# Patient Record
Sex: FEMALE | Race: WHITE | NOT HISPANIC OR LATINO | Employment: OTHER | URBAN - METROPOLITAN AREA
[De-identification: names, ages, dates, MRNs, and addresses within clinical notes are randomized per-mention and may not be internally consistent; named-entity substitution may affect disease eponyms.]

---

## 2017-03-24 ENCOUNTER — OFFICE VISIT - HEALTHEAST (OUTPATIENT)
Dept: INTERNAL MEDICINE | Facility: CLINIC | Age: 72
End: 2017-03-24

## 2017-03-24 DIAGNOSIS — I10 ESSENTIAL HYPERTENSION: ICD-10-CM

## 2017-03-24 DIAGNOSIS — Z00.00 PREVENTATIVE HEALTH CARE: ICD-10-CM

## 2017-03-24 DIAGNOSIS — Z00.00 HEALTHCARE MAINTENANCE: ICD-10-CM

## 2017-03-24 DIAGNOSIS — Z51.81 MEDICATION MONITORING ENCOUNTER: ICD-10-CM

## 2017-03-24 DIAGNOSIS — I10 ESSENTIAL HYPERTENSION WITH GOAL BLOOD PRESSURE LESS THAN 140/90: ICD-10-CM

## 2017-03-24 DIAGNOSIS — E78.00 HYPERCHOLESTEROLEMIA: ICD-10-CM

## 2017-03-24 DIAGNOSIS — E55.9 VITAMIN D DEFICIENCY: ICD-10-CM

## 2017-03-24 DIAGNOSIS — B00.9 HERPES SIMPLEX: ICD-10-CM

## 2017-03-24 DIAGNOSIS — E78.5 HYPERLIPIDEMIA: ICD-10-CM

## 2017-03-24 DIAGNOSIS — Z12.31 OTHER SCREENING MAMMOGRAM: ICD-10-CM

## 2017-03-24 DIAGNOSIS — M19.90 ARTHRITIS: ICD-10-CM

## 2017-03-24 LAB
CHOLEST SERPL-MCNC: 195 MG/DL
FASTING STATUS PATIENT QL REPORTED: YES
HDLC SERPL-MCNC: 78 MG/DL
LDLC SERPL CALC-MCNC: 107 MG/DL
TRIGL SERPL-MCNC: 48 MG/DL

## 2017-03-24 ASSESSMENT — MIFFLIN-ST. JEOR: SCORE: 1290.36

## 2017-03-26 ENCOUNTER — COMMUNICATION - HEALTHEAST (OUTPATIENT)
Dept: NURSING | Facility: CLINIC | Age: 72
End: 2017-03-26

## 2017-03-26 DIAGNOSIS — J30.9 ALLERGIC RHINITIS: ICD-10-CM

## 2017-03-28 ENCOUNTER — COMMUNICATION - HEALTHEAST (OUTPATIENT)
Dept: INTERNAL MEDICINE | Facility: CLINIC | Age: 72
End: 2017-03-28

## 2017-04-17 ENCOUNTER — RECORDS - HEALTHEAST (OUTPATIENT)
Dept: ADMINISTRATIVE | Facility: OTHER | Age: 72
End: 2017-04-17

## 2017-04-17 ENCOUNTER — RECORDS - HEALTHEAST (OUTPATIENT)
Dept: BONE DENSITY | Facility: CLINIC | Age: 72
End: 2017-04-17

## 2017-04-17 ENCOUNTER — RECORDS - HEALTHEAST (OUTPATIENT)
Dept: MAMMOGRAPHY | Facility: CLINIC | Age: 72
End: 2017-04-17

## 2017-04-17 DIAGNOSIS — Z00.00 ENCOUNTER FOR GENERAL ADULT MEDICAL EXAMINATION WITHOUT ABNORMAL FINDINGS: ICD-10-CM

## 2017-04-19 ENCOUNTER — COMMUNICATION - HEALTHEAST (OUTPATIENT)
Dept: INTERNAL MEDICINE | Facility: CLINIC | Age: 72
End: 2017-04-19

## 2017-05-10 ENCOUNTER — RECORDS - HEALTHEAST (OUTPATIENT)
Dept: ADMINISTRATIVE | Facility: OTHER | Age: 72
End: 2017-05-10

## 2017-08-21 ENCOUNTER — RECORDS - HEALTHEAST (OUTPATIENT)
Dept: ADMINISTRATIVE | Facility: OTHER | Age: 72
End: 2017-08-21

## 2017-12-29 ENCOUNTER — AMBULATORY - HEALTHEAST (OUTPATIENT)
Dept: INTERNAL MEDICINE | Facility: CLINIC | Age: 72
End: 2017-12-29

## 2017-12-29 ENCOUNTER — OFFICE VISIT - HEALTHEAST (OUTPATIENT)
Dept: INTERNAL MEDICINE | Facility: CLINIC | Age: 72
End: 2017-12-29

## 2017-12-29 ENCOUNTER — COMMUNICATION - HEALTHEAST (OUTPATIENT)
Dept: INTERNAL MEDICINE | Facility: CLINIC | Age: 72
End: 2017-12-29

## 2017-12-29 DIAGNOSIS — E78.00 HYPERCHOLESTEROLEMIA: ICD-10-CM

## 2017-12-29 DIAGNOSIS — Z01.818 PREOP EXAM FOR INTERNAL MEDICINE: ICD-10-CM

## 2017-12-29 DIAGNOSIS — R09.89 BRUIT OF LEFT CAROTID ARTERY: ICD-10-CM

## 2017-12-29 DIAGNOSIS — I10 ESSENTIAL HYPERTENSION: ICD-10-CM

## 2017-12-29 DIAGNOSIS — E78.00 PURE HYPERCHOLESTEROLEMIA: ICD-10-CM

## 2017-12-29 LAB
CHOLEST SERPL-MCNC: 210 MG/DL
FASTING STATUS PATIENT QL REPORTED: YES
HDLC SERPL-MCNC: 83 MG/DL
LDLC SERPL CALC-MCNC: 110 MG/DL
TRIGL SERPL-MCNC: 83 MG/DL

## 2017-12-29 ASSESSMENT — MIFFLIN-ST. JEOR: SCORE: 1270.86

## 2018-01-02 LAB
ATRIAL RATE - MUSE: 83 BPM
DIASTOLIC BLOOD PRESSURE - MUSE: NORMAL MMHG
INTERPRETATION ECG - MUSE: NORMAL
P AXIS - MUSE: 48 DEGREES
PR INTERVAL - MUSE: 166 MS
QRS DURATION - MUSE: 98 MS
QT - MUSE: 374 MS
QTC - MUSE: 439 MS
R AXIS - MUSE: 40 DEGREES
SYSTOLIC BLOOD PRESSURE - MUSE: NORMAL MMHG
T AXIS - MUSE: 25 DEGREES
VENTRICULAR RATE- MUSE: 83 BPM

## 2018-01-03 ENCOUNTER — AMBULATORY - HEALTHEAST (OUTPATIENT)
Dept: LAB | Facility: CLINIC | Age: 73
End: 2018-01-03

## 2018-01-03 ENCOUNTER — COMMUNICATION - HEALTHEAST (OUTPATIENT)
Dept: INTERNAL MEDICINE | Facility: CLINIC | Age: 73
End: 2018-01-03

## 2018-01-03 DIAGNOSIS — R09.89 BRUIT OF LEFT CAROTID ARTERY: ICD-10-CM

## 2018-01-03 LAB
ANION GAP SERPL CALCULATED.3IONS-SCNC: 9 MMOL/L (ref 5–18)
BUN SERPL-MCNC: 15 MG/DL (ref 8–28)
CALCIUM SERPL-MCNC: 10 MG/DL (ref 8.5–10.5)
CHLORIDE BLD-SCNC: 101 MMOL/L (ref 98–107)
CO2 SERPL-SCNC: 28 MMOL/L (ref 22–31)
CREAT SERPL-MCNC: 0.69 MG/DL (ref 0.6–1.1)
GFR SERPL CREATININE-BSD FRML MDRD: >60 ML/MIN/1.73M2
GLUCOSE BLD-MCNC: 87 MG/DL (ref 70–125)
POTASSIUM BLD-SCNC: 4.7 MMOL/L (ref 3.5–5)
SODIUM SERPL-SCNC: 138 MMOL/L (ref 136–145)

## 2018-01-03 ASSESSMENT — MIFFLIN-ST. JEOR: SCORE: 1268.59

## 2018-01-04 ENCOUNTER — COMMUNICATION - HEALTHEAST (OUTPATIENT)
Dept: INTERNAL MEDICINE | Facility: CLINIC | Age: 73
End: 2018-01-04

## 2018-01-04 ASSESSMENT — MIFFLIN-ST. JEOR: SCORE: 1256.11

## 2018-01-08 ENCOUNTER — ANESTHESIA - HEALTHEAST (OUTPATIENT)
Dept: SURGERY | Facility: CLINIC | Age: 73
End: 2018-01-08

## 2018-01-09 ENCOUNTER — SURGERY - HEALTHEAST (OUTPATIENT)
Dept: SURGERY | Facility: CLINIC | Age: 73
End: 2018-01-09

## 2018-01-25 ENCOUNTER — RECORDS - HEALTHEAST (OUTPATIENT)
Dept: ADMINISTRATIVE | Facility: OTHER | Age: 73
End: 2018-01-25

## 2018-02-22 ENCOUNTER — RECORDS - HEALTHEAST (OUTPATIENT)
Dept: ADMINISTRATIVE | Facility: OTHER | Age: 73
End: 2018-02-22

## 2018-03-25 ENCOUNTER — COMMUNICATION - HEALTHEAST (OUTPATIENT)
Dept: INTERNAL MEDICINE | Facility: CLINIC | Age: 73
End: 2018-03-25

## 2018-03-25 DIAGNOSIS — E78.5 HYPERLIPIDEMIA: ICD-10-CM

## 2018-03-25 DIAGNOSIS — I10 ESSENTIAL HYPERTENSION: ICD-10-CM

## 2018-04-17 ENCOUNTER — COMMUNICATION - HEALTHEAST (OUTPATIENT)
Dept: INTERNAL MEDICINE | Facility: CLINIC | Age: 73
End: 2018-04-17

## 2018-04-17 DIAGNOSIS — I10 ESSENTIAL HYPERTENSION: ICD-10-CM

## 2018-04-25 ENCOUNTER — RECORDS - HEALTHEAST (OUTPATIENT)
Dept: ADMINISTRATIVE | Facility: OTHER | Age: 73
End: 2018-04-25

## 2018-06-05 ENCOUNTER — COMMUNICATION - HEALTHEAST (OUTPATIENT)
Dept: INTERNAL MEDICINE | Facility: CLINIC | Age: 73
End: 2018-06-05

## 2018-06-05 DIAGNOSIS — B00.9 HERPES SIMPLEX: ICD-10-CM

## 2018-06-17 ENCOUNTER — RECORDS - HEALTHEAST (OUTPATIENT)
Dept: ADMINISTRATIVE | Facility: OTHER | Age: 73
End: 2018-06-17

## 2018-06-20 ENCOUNTER — RECORDS - HEALTHEAST (OUTPATIENT)
Dept: ADMINISTRATIVE | Facility: OTHER | Age: 73
End: 2018-06-20

## 2018-10-16 ENCOUNTER — COMMUNICATION - HEALTHEAST (OUTPATIENT)
Dept: INTERNAL MEDICINE | Facility: CLINIC | Age: 73
End: 2018-10-16

## 2018-10-16 DIAGNOSIS — B00.9 HERPES SIMPLEX: ICD-10-CM

## 2018-12-19 ENCOUNTER — COMMUNICATION - HEALTHEAST (OUTPATIENT)
Dept: INTERNAL MEDICINE | Facility: CLINIC | Age: 73
End: 2018-12-19

## 2018-12-19 DIAGNOSIS — Z00.00 ROUTINE HEALTH MAINTENANCE: ICD-10-CM

## 2019-02-18 ENCOUNTER — COMMUNICATION - HEALTHEAST (OUTPATIENT)
Dept: INTERNAL MEDICINE | Facility: CLINIC | Age: 74
End: 2019-02-18

## 2019-02-18 DIAGNOSIS — E78.00 PURE HYPERCHOLESTEROLEMIA: ICD-10-CM

## 2019-03-12 ENCOUNTER — OFFICE VISIT - HEALTHEAST (OUTPATIENT)
Dept: INTERNAL MEDICINE | Facility: CLINIC | Age: 74
End: 2019-03-12

## 2019-03-12 DIAGNOSIS — M21.611 BUNION, RIGHT: ICD-10-CM

## 2019-03-12 DIAGNOSIS — E78.00 HYPERCHOLESTEROLEMIA: ICD-10-CM

## 2019-03-12 DIAGNOSIS — I10 ESSENTIAL HYPERTENSION: ICD-10-CM

## 2019-03-12 DIAGNOSIS — I65.23 CAROTID ATHEROSCLEROSIS, BILATERAL: ICD-10-CM

## 2019-03-12 DIAGNOSIS — Z12.11 SCREEN FOR COLON CANCER: ICD-10-CM

## 2019-03-12 DIAGNOSIS — Z51.81 MEDICATION MONITORING ENCOUNTER: ICD-10-CM

## 2019-03-12 DIAGNOSIS — Z00.00 PREVENTATIVE HEALTH CARE: ICD-10-CM

## 2019-03-12 DIAGNOSIS — R43.0 ANOSMIA: ICD-10-CM

## 2019-03-12 LAB
ALBUMIN SERPL-MCNC: 4.4 G/DL (ref 3.5–5)
ALP SERPL-CCNC: 76 U/L (ref 45–120)
ALT SERPL W P-5'-P-CCNC: 31 U/L (ref 0–45)
ANION GAP SERPL CALCULATED.3IONS-SCNC: 10 MMOL/L (ref 5–18)
AST SERPL W P-5'-P-CCNC: 34 U/L (ref 0–40)
BILIRUB SERPL-MCNC: 0.6 MG/DL (ref 0–1)
BUN SERPL-MCNC: 11 MG/DL (ref 8–28)
CALCIUM SERPL-MCNC: 10.2 MG/DL (ref 8.5–10.5)
CHLORIDE BLD-SCNC: 92 MMOL/L (ref 98–107)
CHOLEST SERPL-MCNC: 165 MG/DL
CO2 SERPL-SCNC: 26 MMOL/L (ref 22–31)
CREAT SERPL-MCNC: 0.72 MG/DL (ref 0.6–1.1)
ERYTHROCYTE [DISTWIDTH] IN BLOOD BY AUTOMATED COUNT: 11.1 % (ref 11–14.5)
FASTING STATUS PATIENT QL REPORTED: YES
GFR SERPL CREATININE-BSD FRML MDRD: >60 ML/MIN/1.73M2
GLUCOSE BLD-MCNC: 75 MG/DL (ref 70–125)
HCT VFR BLD AUTO: 38.7 % (ref 35–47)
HDLC SERPL-MCNC: 77 MG/DL
HGB BLD-MCNC: 13.2 G/DL (ref 12–16)
LDLC SERPL CALC-MCNC: 75 MG/DL
MCH RBC QN AUTO: 33.3 PG (ref 27–34)
MCHC RBC AUTO-ENTMCNC: 34.1 G/DL (ref 32–36)
MCV RBC AUTO: 98 FL (ref 80–100)
PLATELET # BLD AUTO: 322 THOU/UL (ref 140–440)
PMV BLD AUTO: 6.7 FL (ref 7–10)
POTASSIUM BLD-SCNC: 4.6 MMOL/L (ref 3.5–5)
PROT SERPL-MCNC: 7.2 G/DL (ref 6–8)
RBC # BLD AUTO: 3.96 MILL/UL (ref 3.8–5.4)
SODIUM SERPL-SCNC: 128 MMOL/L (ref 136–145)
TRIGL SERPL-MCNC: 63 MG/DL
TSH SERPL DL<=0.005 MIU/L-ACNC: 0.96 UIU/ML (ref 0.3–5)
WBC: 5.7 THOU/UL (ref 4–11)

## 2019-03-12 ASSESSMENT — MIFFLIN-ST. JEOR: SCORE: 1174.75

## 2019-03-13 ENCOUNTER — COMMUNICATION - HEALTHEAST (OUTPATIENT)
Dept: INTERNAL MEDICINE | Facility: CLINIC | Age: 74
End: 2019-03-13

## 2019-03-19 ENCOUNTER — RECORDS - HEALTHEAST (OUTPATIENT)
Dept: MAMMOGRAPHY | Facility: CLINIC | Age: 74
End: 2019-03-19

## 2019-03-19 DIAGNOSIS — Z12.31 ENCOUNTER FOR SCREENING MAMMOGRAM FOR MALIGNANT NEOPLASM OF BREAST: ICD-10-CM

## 2019-03-20 ENCOUNTER — COMMUNICATION - HEALTHEAST (OUTPATIENT)
Dept: INTERNAL MEDICINE | Facility: CLINIC | Age: 74
End: 2019-03-20

## 2019-03-20 ENCOUNTER — HOSPITAL ENCOUNTER (OUTPATIENT)
Dept: ULTRASOUND IMAGING | Facility: CLINIC | Age: 74
Discharge: HOME | End: 2019-03-20
Attending: INTERNAL MEDICINE

## 2019-03-20 ENCOUNTER — RECORDS - HEALTHEAST (OUTPATIENT)
Dept: ADMINISTRATIVE | Facility: OTHER | Age: 74
End: 2019-03-20

## 2019-03-20 ENCOUNTER — COMMUNICATION - HEALTHEAST (OUTPATIENT)
Dept: TELEHEALTH | Facility: CLINIC | Age: 74
End: 2019-03-20

## 2019-03-20 DIAGNOSIS — I65.23 CAROTID ATHEROSCLEROSIS, BILATERAL: ICD-10-CM

## 2019-03-20 LAB — COLOGUARD-ABSTRACT: POSITIVE

## 2019-03-29 ENCOUNTER — COMMUNICATION - HEALTHEAST (OUTPATIENT)
Dept: INTERNAL MEDICINE | Facility: CLINIC | Age: 74
End: 2019-03-29

## 2019-03-29 DIAGNOSIS — R89.9 ABNORMAL LABORATORY TEST RESULT: ICD-10-CM

## 2019-04-02 ENCOUNTER — OFFICE VISIT - HEALTHEAST (OUTPATIENT)
Dept: OTOLARYNGOLOGY | Facility: CLINIC | Age: 74
End: 2019-04-02

## 2019-04-02 ENCOUNTER — HOSPITAL ENCOUNTER (OUTPATIENT)
Dept: CT IMAGING | Facility: CLINIC | Age: 74
Discharge: HOME | End: 2019-04-02
Attending: OTOLARYNGOLOGY

## 2019-04-02 DIAGNOSIS — R43.8 HYPOSMIA: ICD-10-CM

## 2019-04-02 DIAGNOSIS — J32.4 CHRONIC PANSINUSITIS: ICD-10-CM

## 2019-04-09 ENCOUNTER — COMMUNICATION - HEALTHEAST (OUTPATIENT)
Dept: OTOLARYNGOLOGY | Facility: CLINIC | Age: 74
End: 2019-04-09

## 2019-04-16 ENCOUNTER — RECORDS - HEALTHEAST (OUTPATIENT)
Dept: HEALTH INFORMATION MANAGEMENT | Facility: CLINIC | Age: 74
End: 2019-04-16

## 2019-04-16 ENCOUNTER — OFFICE VISIT - HEALTHEAST (OUTPATIENT)
Dept: OTOLARYNGOLOGY | Facility: CLINIC | Age: 74
End: 2019-04-16

## 2019-04-16 DIAGNOSIS — J32.4 CHRONIC PANSINUSITIS: ICD-10-CM

## 2019-05-17 ENCOUNTER — OFFICE VISIT - HEALTHEAST (OUTPATIENT)
Dept: INTERNAL MEDICINE | Facility: CLINIC | Age: 74
End: 2019-05-17

## 2019-05-17 DIAGNOSIS — I10 ESSENTIAL HYPERTENSION: ICD-10-CM

## 2019-05-17 DIAGNOSIS — J01.10 ACUTE FRONTAL SINUSITIS, RECURRENCE NOT SPECIFIED: ICD-10-CM

## 2019-05-17 ASSESSMENT — MIFFLIN-ST. JEOR: SCORE: 1182.41

## 2019-06-04 ENCOUNTER — OFFICE VISIT - HEALTHEAST (OUTPATIENT)
Dept: INTERNAL MEDICINE | Facility: CLINIC | Age: 74
End: 2019-06-04

## 2019-06-04 DIAGNOSIS — Z01.818 PRE-OPERATIVE EXAMINATION FOR INTERNAL MEDICINE: ICD-10-CM

## 2019-06-04 DIAGNOSIS — I65.23 ATHEROSCLEROSIS OF BOTH CAROTID ARTERIES: ICD-10-CM

## 2019-06-04 DIAGNOSIS — E87.1 HYPONATREMIA: ICD-10-CM

## 2019-06-04 DIAGNOSIS — I10 ESSENTIAL HYPERTENSION: ICD-10-CM

## 2019-06-04 LAB
ANION GAP SERPL CALCULATED.3IONS-SCNC: 7 MMOL/L (ref 5–18)
ATRIAL RATE - MUSE: 77 BPM
BUN SERPL-MCNC: 14 MG/DL (ref 8–28)
CALCIUM SERPL-MCNC: 9.9 MG/DL (ref 8.5–10.5)
CHLORIDE BLD-SCNC: 98 MMOL/L (ref 98–107)
CO2 SERPL-SCNC: 29 MMOL/L (ref 22–31)
CREAT SERPL-MCNC: 0.71 MG/DL (ref 0.6–1.1)
DIASTOLIC BLOOD PRESSURE - MUSE: NORMAL MMHG
ERYTHROCYTE [DISTWIDTH] IN BLOOD BY AUTOMATED COUNT: 10.9 % (ref 11–14.5)
GFR SERPL CREATININE-BSD FRML MDRD: >60 ML/MIN/1.73M2
GLUCOSE BLD-MCNC: 81 MG/DL (ref 70–125)
HCT VFR BLD AUTO: 37.7 % (ref 35–47)
HGB BLD-MCNC: 13 G/DL (ref 12–16)
INTERPRETATION ECG - MUSE: NORMAL
MCH RBC QN AUTO: 33.6 PG (ref 27–34)
MCHC RBC AUTO-ENTMCNC: 34.4 G/DL (ref 32–36)
MCV RBC AUTO: 98 FL (ref 80–100)
P AXIS - MUSE: -5 DEGREES
PLATELET # BLD AUTO: 304 THOU/UL (ref 140–440)
PMV BLD AUTO: 6.9 FL (ref 7–10)
POTASSIUM BLD-SCNC: 4.8 MMOL/L (ref 3.5–5)
PR INTERVAL - MUSE: 180 MS
QRS DURATION - MUSE: 90 MS
QT - MUSE: 370 MS
QTC - MUSE: 418 MS
R AXIS - MUSE: 5 DEGREES
RBC # BLD AUTO: 3.87 MILL/UL (ref 3.8–5.4)
SODIUM SERPL-SCNC: 134 MMOL/L (ref 136–145)
SYSTOLIC BLOOD PRESSURE - MUSE: NORMAL MMHG
T AXIS - MUSE: 13 DEGREES
VENTRICULAR RATE- MUSE: 77 BPM
WBC: 5 THOU/UL (ref 4–11)

## 2019-06-04 ASSESSMENT — MIFFLIN-ST. JEOR: SCORE: 1163.36

## 2019-06-13 ENCOUNTER — RECORDS - HEALTHEAST (OUTPATIENT)
Dept: ADMINISTRATIVE | Facility: OTHER | Age: 74
End: 2019-06-13

## 2019-06-13 ASSESSMENT — MIFFLIN-ST. JEOR: SCORE: 1159.73

## 2019-06-17 ENCOUNTER — ANESTHESIA - HEALTHEAST (OUTPATIENT)
Dept: SURGERY | Facility: AMBULATORY SURGERY CENTER | Age: 74
End: 2019-06-17

## 2019-06-18 ENCOUNTER — SURGERY - HEALTHEAST (OUTPATIENT)
Dept: SURGERY | Facility: AMBULATORY SURGERY CENTER | Age: 74
End: 2019-06-18

## 2019-06-18 ASSESSMENT — MIFFLIN-ST. JEOR: SCORE: 1159.73

## 2019-07-03 ENCOUNTER — OFFICE VISIT - HEALTHEAST (OUTPATIENT)
Dept: OTOLARYNGOLOGY | Facility: CLINIC | Age: 74
End: 2019-07-03

## 2019-07-03 DIAGNOSIS — J33.8 NASAL SINUS POLYP: ICD-10-CM

## 2019-07-11 ENCOUNTER — OFFICE VISIT - HEALTHEAST (OUTPATIENT)
Dept: INTERNAL MEDICINE | Facility: CLINIC | Age: 74
End: 2019-07-11

## 2019-07-11 DIAGNOSIS — L98.9 SKIN LESION: ICD-10-CM

## 2019-07-11 DIAGNOSIS — N89.8 VAGINAL ITCHING: ICD-10-CM

## 2019-07-11 LAB
CLUE CELLS: NORMAL
TRICHOMONAS, WET PREP: NORMAL
YEAST, WET PREP: NORMAL

## 2019-07-11 RX ORDER — CLOBETASOL PROPIONATE 0.5 MG/G
OINTMENT TOPICAL
Qty: 30 G | Refills: 3 | Status: SHIPPED | OUTPATIENT
Start: 2019-07-11 | End: 2021-10-15

## 2019-07-11 RX ORDER — NAPROXEN SODIUM 220 MG
220 TABLET ORAL 2 TIMES DAILY WITH MEALS
Status: SHIPPED | COMMUNITY
Start: 2019-07-11 | End: 2022-05-18

## 2019-07-11 ASSESSMENT — MIFFLIN-ST. JEOR: SCORE: 1146.12

## 2019-07-15 ENCOUNTER — COMMUNICATION - HEALTHEAST (OUTPATIENT)
Dept: INTERNAL MEDICINE | Facility: CLINIC | Age: 74
End: 2019-07-15

## 2019-07-15 DIAGNOSIS — I10 ESSENTIAL HYPERTENSION: ICD-10-CM

## 2019-07-18 ENCOUNTER — COMMUNICATION - HEALTHEAST (OUTPATIENT)
Dept: INTERNAL MEDICINE | Facility: CLINIC | Age: 74
End: 2019-07-18

## 2019-07-23 ENCOUNTER — COMMUNICATION - HEALTHEAST (OUTPATIENT)
Dept: INTERNAL MEDICINE | Facility: CLINIC | Age: 74
End: 2019-07-23

## 2019-07-23 DIAGNOSIS — B00.9 HERPES SIMPLEX: ICD-10-CM

## 2019-09-03 ENCOUNTER — RECORDS - HEALTHEAST (OUTPATIENT)
Dept: ADMINISTRATIVE | Facility: OTHER | Age: 74
End: 2019-09-03

## 2019-09-04 ENCOUNTER — OFFICE VISIT - HEALTHEAST (OUTPATIENT)
Dept: OTOLARYNGOLOGY | Facility: CLINIC | Age: 74
End: 2019-09-04

## 2019-09-04 DIAGNOSIS — J33.8 NASAL SINUS POLYP: ICD-10-CM

## 2019-09-04 DIAGNOSIS — J32.4 CHRONIC PANSINUSITIS: ICD-10-CM

## 2019-09-06 ENCOUNTER — COMMUNICATION - HEALTHEAST (OUTPATIENT)
Dept: INTERNAL MEDICINE | Facility: CLINIC | Age: 74
End: 2019-09-06

## 2019-09-06 DIAGNOSIS — K64.9 HEMORRHOIDS, UNSPECIFIED HEMORRHOID TYPE: ICD-10-CM

## 2019-10-10 ENCOUNTER — AMBULATORY - HEALTHEAST (OUTPATIENT)
Dept: NURSING | Facility: CLINIC | Age: 74
End: 2019-10-10

## 2019-10-10 DIAGNOSIS — Z23 FLU VACCINE NEED: ICD-10-CM

## 2020-02-12 ENCOUNTER — COMMUNICATION - HEALTHEAST (OUTPATIENT)
Dept: INTERNAL MEDICINE | Facility: CLINIC | Age: 75
End: 2020-02-12

## 2020-02-12 DIAGNOSIS — E78.00 PURE HYPERCHOLESTEROLEMIA: ICD-10-CM

## 2020-07-28 ENCOUNTER — COMMUNICATION - HEALTHEAST (OUTPATIENT)
Dept: INTERNAL MEDICINE | Facility: CLINIC | Age: 75
End: 2020-07-28

## 2020-07-28 DIAGNOSIS — B00.9 HERPES SIMPLEX: ICD-10-CM

## 2020-07-28 DIAGNOSIS — I10 ESSENTIAL HYPERTENSION: ICD-10-CM

## 2020-07-30 RX ORDER — VALACYCLOVIR HYDROCHLORIDE 1 G/1
TABLET, FILM COATED ORAL
Qty: 90 TABLET | Refills: 3 | Status: SHIPPED | OUTPATIENT
Start: 2020-07-30 | End: 2021-10-20

## 2020-07-30 RX ORDER — LOSARTAN POTASSIUM 25 MG/1
TABLET ORAL
Qty: 90 TABLET | Refills: 3 | Status: SHIPPED | OUTPATIENT
Start: 2020-07-30 | End: 2021-07-22

## 2020-07-31 ENCOUNTER — AMBULATORY - HEALTHEAST (OUTPATIENT)
Dept: LAB | Facility: CLINIC | Age: 75
End: 2020-07-31

## 2020-07-31 ENCOUNTER — OFFICE VISIT - HEALTHEAST (OUTPATIENT)
Dept: FAMILY MEDICINE | Facility: CLINIC | Age: 75
End: 2020-07-31

## 2020-07-31 DIAGNOSIS — H26.9 CATARACT OF BOTH EYES, UNSPECIFIED CATARACT TYPE: ICD-10-CM

## 2020-07-31 DIAGNOSIS — Z01.818 PRE-OP EXAM: ICD-10-CM

## 2020-07-31 LAB
ANION GAP SERPL CALCULATED.3IONS-SCNC: 6 MMOL/L (ref 5–18)
BUN SERPL-MCNC: 10 MG/DL (ref 8–28)
CALCIUM SERPL-MCNC: 9.8 MG/DL (ref 8.5–10.5)
CHLORIDE BLD-SCNC: 95 MMOL/L (ref 98–107)
CO2 SERPL-SCNC: 29 MMOL/L (ref 22–31)
CREAT SERPL-MCNC: 0.73 MG/DL (ref 0.6–1.1)
ERYTHROCYTE [DISTWIDTH] IN BLOOD BY AUTOMATED COUNT: 10.5 % (ref 11–14.5)
GFR SERPL CREATININE-BSD FRML MDRD: >60 ML/MIN/1.73M2
GLUCOSE BLD-MCNC: 93 MG/DL (ref 70–125)
HCT VFR BLD AUTO: 35.6 % (ref 35–47)
HGB BLD-MCNC: 12.2 G/DL (ref 12–16)
MCH RBC QN AUTO: 32.8 PG (ref 27–34)
MCHC RBC AUTO-ENTMCNC: 34.1 G/DL (ref 32–36)
MCV RBC AUTO: 96 FL (ref 80–100)
PLATELET # BLD AUTO: 296 THOU/UL (ref 140–440)
PMV BLD AUTO: 7.8 FL (ref 7–10)
POTASSIUM BLD-SCNC: 4.4 MMOL/L (ref 3.5–5)
RBC # BLD AUTO: 3.71 MILL/UL (ref 3.8–5.4)
SODIUM SERPL-SCNC: 130 MMOL/L (ref 136–145)
WBC: 6.1 THOU/UL (ref 4–11)

## 2020-08-23 ENCOUNTER — COMMUNICATION - HEALTHEAST (OUTPATIENT)
Dept: INTERNAL MEDICINE | Facility: CLINIC | Age: 75
End: 2020-08-23

## 2020-08-23 DIAGNOSIS — I10 ESSENTIAL HYPERTENSION: ICD-10-CM

## 2020-08-25 RX ORDER — HYDROCHLOROTHIAZIDE 25 MG/1
TABLET ORAL
Qty: 90 TABLET | Refills: 3 | Status: SHIPPED | OUTPATIENT
Start: 2020-08-25 | End: 2021-07-22

## 2020-10-29 ENCOUNTER — COMMUNICATION - HEALTHEAST (OUTPATIENT)
Dept: INTERNAL MEDICINE | Facility: CLINIC | Age: 75
End: 2020-10-29

## 2020-10-29 DIAGNOSIS — E78.00 PURE HYPERCHOLESTEROLEMIA: ICD-10-CM

## 2020-11-02 RX ORDER — ATORVASTATIN CALCIUM 80 MG/1
TABLET, FILM COATED ORAL
Qty: 90 TABLET | Refills: 2 | Status: SHIPPED | OUTPATIENT
Start: 2020-11-02 | End: 2021-07-22

## 2020-12-31 ENCOUNTER — COMMUNICATION - HEALTHEAST (OUTPATIENT)
Dept: INTERNAL MEDICINE | Facility: CLINIC | Age: 75
End: 2020-12-31

## 2020-12-31 DIAGNOSIS — K64.9 HEMORRHOIDS, UNSPECIFIED HEMORRHOID TYPE: ICD-10-CM

## 2021-02-04 ENCOUNTER — COMMUNICATION - HEALTHEAST (OUTPATIENT)
Dept: INTERNAL MEDICINE | Facility: CLINIC | Age: 76
End: 2021-02-04

## 2021-02-15 ENCOUNTER — AMBULATORY - HEALTHEAST (OUTPATIENT)
Dept: NURSING | Facility: CLINIC | Age: 76
End: 2021-02-15

## 2021-03-08 ENCOUNTER — AMBULATORY - HEALTHEAST (OUTPATIENT)
Dept: NURSING | Facility: CLINIC | Age: 76
End: 2021-03-08

## 2021-05-26 VITALS — SYSTOLIC BLOOD PRESSURE: 120 MMHG | DIASTOLIC BLOOD PRESSURE: 70 MMHG

## 2021-05-27 NOTE — TELEPHONE ENCOUNTER
Spoke with pt and relayed results of positive cologuard.  Pt understanding and agreeable to colonoscopy.

## 2021-05-27 NOTE — PROGRESS NOTES
HPI: This patient is a 72yo F who presents to discuss the results of her CT scan. The patient reported that since a URI in January, her sense of smell has been greatly diminished. It is slowly improving, though. She also feels some head congestion, nasal congestion, and a hyponasality to her voice that is new. There have not really been episodes of high fever, localized sinus pain, tooth pain, and pururlent drainage. She has taken a course of oral steroids, and flonase and nasal saline daily. Also uses Coricidin, which helps some as well.      Past medical history, surgical history, social history, family history, medications, and allergies have been reviewed with the patient and are documented above.     Review of Systems: a 10-system review was performed. Pertinent positives are noted in the HPI and on a separate scanned document in the chart.     PHYSICAL EXAMINATION:  GEN: no acute distress, normocephalic  EYES: extraocular movements are intact, pupils are equal and round. Sclera clear.   NEURO: CN VII and XII symmetric. alert and oriented. No spontaneous nystagmus. Gait is normal.  PULM: breathing comfortably on room air, normal chest expansion with respiration     CT SINUS:  1.  Fairly extensive mucosal thickening involving the ethmoid air cells bilaterally and sphenoid sinuses.   2.  Mild to moderate mucosal thickening involving both maxillary sinuses. Subtotal opacification of a small left frontal sinus.  3.  Mucosal thickening opacifies the paranasal sinus drainage pathways.    MEDICAL DECISION-MAKING: This patient is a 72yo F with visible allergic rhinitis, but also new significant congestion and hyposmia since Jan. Her CT scan reveals extensive mucosal thickening in the sinuses (ethmoids primarily) and outflow obstruction. Discussed the risks and benefits of a bilateral FESS to open up the drainage system. She would like to schedule.

## 2021-05-27 NOTE — PROGRESS NOTES
HPI: This patient is a 74yo F who presents for evaluation of her sense of smell at the request of Dr. Rodriguez. The patient reports that since a URI in January, her sense of smell has been greatly diminished. It is slowly improving, though. She also feels some head congestion, nasal congestion, and a hyponasality to her voice that is new. There have not really been episodes of high fever, localized sinus pain, tooth pain, and pururlent drainage. She has taken a course of oral steroids, and flonase and nasal saline daily. Also uses Coricidin, which helps some as well. Denies dental grinding/clenching.    Past medical history, surgical history, social history, family history, medications, and allergies have been reviewed with the patient and are documented above.    Review of Systems: a 10-system review was performed. Pertinent positives are noted in the HPI and on a separate scanned document in the chart.    PHYSICAL EXAMINATION:  GEN: no acute distress, normocephalic  EYES: extraocular movements are intact, pupils are equal and round. Sclera clear.   EARS: auricles are normally formed. The external auditory canals are clear with minimal to no cerumen. Tympanic membranes are intact bilaterally with no signs of infection, effusion, retractions, or perforations.  NOSE: anterior nares are patent. There are no masses or lesions. The septum is non-obstructing. Turbinates are boggy and blue. No visible polyps, purulence, or superior nasal cavity masses.  OC/OP: clear, dentition is in good repair. The tongue and palate are fully mobile and symmetric. No masses or lesions.  NECK: soft and supple. No lymphadenopathy or masses. Airway is midline.  NEURO: CN VII and XII symmetric. alert and oriented. No spontaneous nystagmus. Gait is normal.  PULM: breathing comfortably on room air, normal chest expansion with respiration  CARDS: no cyanosis or clubbing. Normal carotid pulses.    MEDICAL DECISION-MAKING: This patient is a 74yo F  with visible allergic rhinitis, but also new significant congestion and hyposmia since Jan. Will have her continue her daily nasal regimen for now and she will need a CT of the sinuses to help shine light on what the underlying issue is.  Will contact her with the CT results when available.

## 2021-05-28 NOTE — PROGRESS NOTES
Office Visit   Cathy Beckwith   73 y.o. female    Date of Visit: 5/17/2019    Chief Complaint   Patient presents with     Sinus Problem     Since January, cough, clear phlegm, green/bloody nasal drainage, sinus pressure for 1.5 weeks        Assessment and Plan   1. Acute frontal sinusitis, recurrence not specified  Her exam and history is consistent with sinusitis.  She had a CT in April that did show sinus mucosal thickening in her symptoms are not worsening.  I will treat her with antibiotics.  If she is not improving or her symptoms start to worsen then she will let us know.  I did discuss potential side effects of antibiotics including diarrhea.  She is in agreement with this plan.  - amoxicillin-clavulanate (AUGMENTIN) 875-125 mg per tablet; Take 1 tablet by mouth 2 (two) times a day for 14 days.  Dispense: 28 tablet; Refill: 0    2. Hypertension  Blood pressure is elevated today.  She states that usually is normal.  She will monitor it outside the clinic.         No follow-ups on file.     History of Present Illness   This 73 y.o. old who comes in with worsening sinus pain and congestion.  States that she has been having difficulty with ongoing sinus pain and congestion since January.  She was evaluated in April and saw ENT at that time.  Her sinus CT did show mucosal thickening but it was thought to be allergic in nature.  Symptoms never really resolved and over the last 2 weeks they have worsened.  She now is having worsening pain, fatigue, and even a cough.  She is not having any shortness of breath.  She has no underlying lung disease.  She has no other acute concerns today.    Review of Systems: As noted above.     Medications, Allergies and Problem List   Patient Active Problem List   Diagnosis     Lower Back Pain Chronic     Hypercholesterolemia     Hypertension     Allergic Rhinitis     Status post total replacement of left shoulder     Current Outpatient Medications   Medication Sig Dispense Refill  "    acetaminophen (TYLENOL) 500 MG tablet Take 1 tablet (500 mg total) by mouth every 4 (four) hours as needed.  0     aspirin 325 MG EC tablet Take 1 tablet (325 mg total) by mouth 2 (two) times a day with meals.  0     atorvastatin (LIPITOR) 80 MG tablet TAKE 1 TABLET BY MOUTH AT BEDTIME 90 tablet 3     cholecalciferol, vitamin D3, 1,000 unit tablet Take 2,000 Units by mouth every evening.       docusate sodium (COLACE) 100 MG capsule Take 1 capsule (100 mg total) by mouth 2 (two) times a day as needed for constipation.  0     fluticasone (FLONASE) 50 mcg/actuation nasal spray Apply 2 sprays into each nostril daily as needed for rhinitis.       hydroCHLOROthiazide (HYDRODIURIL) 25 MG tablet Take 12.5 mg by mouth every evening.              ibuprofen (ADVIL,MOTRIN) 400 MG tablet Take 1 tablet (400 mg total) by mouth every 6 (six) hours as needed for pain or mild pain (1-3).  0     losartan (COZAAR) 25 MG tablet Take 1 tablet (25 mg total) by mouth daily. 90 tablet 2     valACYclovir (VALTREX) 1000 MG tablet Take 500 mg by mouth every evening.       valACYclovir (VALTREX) 1000 MG tablet TAKE 1 TABLET (1,000 MG TOTAL) BY MOUTH DAILY. 90 tablet 2     amoxicillin-clavulanate (AUGMENTIN) 875-125 mg per tablet Take 1 tablet by mouth 2 (two) times a day for 14 days. 28 tablet 0     No current facility-administered medications for this visit.      Allergies   Allergen Reactions     Ace Inhibitors      Didn't feel well     Codeine Nausea Only          Physical Exam     /80   Pulse 76   Temp 98.4  F (36.9  C)   Ht 5' 4.5\" (1.638 m)   Wt 152 lb (68.9 kg)   SpO2 99%   BMI 25.69 kg/m      General:  Patient is alert and in no apparent distress.  Does cough throughout the exam but no respiratory distress.  ENT: Tympanic membranes are translucent bilaterally.  No severe pain with palpation over the sinuses.  Oropharynx does show posterior pharyngeal erythema but no exudates.  Neck:  Supple with no adenopathy or thyroid " abnormality noted.  Cardiovascular:  Regular rate and rhythm, normal S1/S2, no murmurs, rubs, or gallop.  Pulmonary:  Lungs are clear to auscultation bilaterally with normal respiratory effort.  No wheezes or rales are detected.         Additional Information   Social History     Tobacco Use     Smoking status: Never Smoker     Smokeless tobacco: Never Used   Substance Use Topics     Alcohol use: Yes     Comment: 4 glasses a week     Drug use: No            Sadia Wynne MD

## 2021-05-29 NOTE — ANESTHESIA CARE TRANSFER NOTE
Last vitals:   Vitals:    06/18/19 1418   BP: (!) 182/89   Pulse: 89   Resp: 14   Temp: 36.6  C (97.8  F)   SpO2: 100%     Pt brought to PACU on 10L facemask. Monitors applied. VSS upon arrival.    Patient's level of consciousness is drowsy  Spontaneous respirations: yes  Maintains airway independently: yes  Dentition unchanged: yes  Oropharynx: oropharynx clear of all foreign objects    QCDR Measures:  ASA# 20 - Surgical Safety Checklist: WHO surgical safety checklist completed prior to induction    PQRS# 430 - Adult PONV Prevention: 4558F - Pt received => 2 anti-emetic agents (different classes) preop & intraop  ASA# 8 - Peds PONV Prevention: NA - Not pediatric patient, not GA or 2 or more risk factors NOT present  PQRS# 424 - Kindra-op Temp Management: 4559F - At least one body temp DOCUMENTED => 35.5C or 95.9F within required timeframe  PQRS# 426 - PACU Transfer Protocol: - Transfer of care checklist used  ASA# 14 - Acute Post-op Pain: ASA14B - Patient did NOT experience pain >= 7 out of 10

## 2021-05-29 NOTE — ANESTHESIA POSTPROCEDURE EVALUATION
Patient: Cathy Beckwith  BILATERAL FUNCTIONAL ENDOSCOPIC SINUS SURGERY  Anesthesia type: general    Patient location: PACU  Last vitals:   Vitals Value Taken Time   /87 6/18/2019  3:00 PM   Temp 36.6  C (97.8  F) 6/18/2019  2:18 PM   Pulse 67 6/18/2019  3:09 PM   Resp 16 6/18/2019  3:00 PM   SpO2 96 % 6/18/2019  3:09 PM   Vitals shown include unvalidated device data.  Post vital signs: stable  Level of consciousness: awake and responds to simple questions  Post-anesthesia pain: pain controlled  Post-anesthesia nausea and vomiting: no  Pulmonary: unassisted, return to baseline  Cardiovascular: stable, blood pressure at baseline and after labetalol   Hydration: adequate  Anesthetic events: no    QCDR Measures:  ASA# 11 - Kindra-op Cardiac Arrest: ASA11B - Patient did NOT experience unanticipated cardiac arrest  ASA# 12 - Kindra-op Mortality Rate: ASA12B - Patient did NOT die  ASA# 13 - PACU Re-Intubation Rate: ASA13B - Patient did NOT require a new airway mgmt  ASA# 10 - Composite Anes Safety: ASA10A - No serious adverse event    Additional Notes:

## 2021-05-29 NOTE — PROGRESS NOTES
Preoperative Exam    Scheduled Procedure: Sinus surgery for persistent sinusitis  Surgery Date:  6/18/19  Surgery Location: [facility] / [facility], fax [phone]    Surgeon:  Dr Mccoy     Assessment/Plan:     1. Pre-operative examination for internal medicine  Cathy is medically stable for anesthesia related to sinus surgery.  She is currently free from symptoms of an infectious disease nature.  She has no symptoms of a cardiovascular nature.    Medications are reviewed with her.  She is going to hold her diuretic on the a.m. of surgery.  She will take her losartan with sips of water.  Additionally, she is going to withhold aspirin and nonsteroidal anti-inflammatory medications 7 to 10 days before her surgical procedure.    - Electrocardiogram Perform and Read-personally reviewed.  Normal sinus rhythm with no acute abnormalities  - HM2(CBC w/o Differential)  - Basic Metabolic Panel    2. Hypertension  Controlled on current medications    3. Hyponatremia  With recent reduction of hydrochlorothiazide dosage.  Will reassess  - Basic Metabolic Panel    4. Atherosclerosis of both carotid arteries  Following annual carotid ultrasounds.  She is on a atorvastatin at 80 mg.  We will continue the same    5.  Positive Cologuard  She is scheduled for colonoscopy this week.    Surgical Procedure Risk: Low (reported cardiac risk generally < 1%)  Have you had prior anesthesia?: Yes  Have you or any family members had a previous anesthesia reaction:  No  Do you or any family members have a history of a clotting or bleeding disorder?: No  Cardiac Risk Assessment: no increased risk for major cardiac complications    Patient approved for surgery with general or local anesthesia.        Functional Status: Independent  Patient plans to recover at home with family.     Subjective:      Cathy Beckwith is a 73 y.o. female who presents for a preoperative consultation.  Of note, she has had marked difficulty with recurrent signs  infections.  CT scan of the sinuses shows significant opacifications and abnormalities.  She is therefore scheduled for definitive treatment for this problem.    Currently, she denies any symptoms of an infectious disease nature.  She is recently been on steroids and antibiotics for recurrent sinusitis.  She denies any fever, chills, chest pain or shortness of breath.    Pertinent anesthesia history: She denies any untoward reactions to local or general anesthetic agents.  She denies any history of bleeding disorder or blood clots.  Currently, she is functionally active able to achieve greater than 4 METS of activity.  She has no personal history of coronary artery disease, type 2 diabetes, arrhythmia, or chronic lung disease.  She does have bilateral carotid atherosclerosis which is being followed with serial ultrasounds.  She is a non-smoker.    All other systems reviewed and are negative, other than those listed in the HPI.    Pertinent History  Do you have difficulty breathing or chest pain after walking up a flight of stairs: No  History of obstructive sleep apnea: No  Steroid use in the last 6 months: Yes: Recent use for acute inflammation of the sinuses  Frequent Aspirin/NSAID use: Yes: She will hold aspirin 7 days before surgery  Prior Blood Transfusion: No  Prior Blood Transfusion Reaction: No  If for some reason prior to, during or after the procedure, if it is medically indicated, would you be willing to have a blood transfusion?:  There is no transfusion refusal.    Current Outpatient Medications   Medication Sig Dispense Refill     acetaminophen (TYLENOL) 500 MG tablet Take 1 tablet (500 mg total) by mouth every 4 (four) hours as needed.  0     aspirin 325 MG EC tablet Take 1 tablet (325 mg total) by mouth 2 (two) times a day with meals.  0     atorvastatin (LIPITOR) 80 MG tablet TAKE 1 TABLET BY MOUTH AT BEDTIME 90 tablet 3     cholecalciferol, vitamin D3, 1,000 unit tablet Take 2,000 Units by mouth  every evening.       docusate sodium (COLACE) 100 MG capsule Take 1 capsule (100 mg total) by mouth 2 (two) times a day as needed for constipation.  0     fluticasone (FLONASE) 50 mcg/actuation nasal spray Apply 2 sprays into each nostril daily as needed for rhinitis.       hydroCHLOROthiazide (HYDRODIURIL) 25 MG tablet Take 12.5 mg by mouth every evening.              ibuprofen (ADVIL,MOTRIN) 400 MG tablet Take 1 tablet (400 mg total) by mouth every 6 (six) hours as needed for pain or mild pain (1-3).  0     losartan (COZAAR) 25 MG tablet Take 1 tablet (25 mg total) by mouth daily. 90 tablet 2     valACYclovir (VALTREX) 1000 MG tablet TAKE 1 TABLET (1,000 MG TOTAL) BY MOUTH DAILY. 90 tablet 2     valACYclovir (VALTREX) 1000 MG tablet Take 500 mg by mouth every evening.       No current facility-administered medications for this visit.         Allergies   Allergen Reactions     Ace Inhibitors      Didn't feel well     Codeine Nausea Only       Patient Active Problem List   Diagnosis     Lower Back Pain Chronic     Hypercholesterolemia     Hypertension     Allergic Rhinitis     Status post total replacement of left shoulder       Past Medical History:   Diagnosis Date     Allergic rhinitis      Hyperlipidemia      Hypertension        Past Surgical History:   Procedure Laterality Date     BUNIONECTOMY       MI LIGATE FALLOPIAN TUBE      Description: Tubal Ligation;  Recorded: 09/29/2009;     MI RECONSTR TOTAL SHOULDER IMPLANT Left 1/9/2018    Procedure: LEFT TOTAL SHOULDER ARTHROPLASTY;  Surgeon: Freddie Flores MD;  Location: [facility];  Service: Orthopedics       Social History     Socioeconomic History     Marital status:      Spouse name: Not on file     Number of children: Not on file     Years of education: Not on file     Highest education level: Not on file   Occupational History     Not on file   Social Needs     Financial resource strain: Not on file     Food insecurity:     Worry: Not on file  "    Inability: Not on file     Transportation needs:     Medical: Not on file     Non-medical: Not on file   Tobacco Use     Smoking status: Never Smoker     Smokeless tobacco: Never Used   Substance and Sexual Activity     Alcohol use: Yes     Comment: 4 glasses a week     Drug use: No     Sexual activity: Not on file   Lifestyle     Physical activity:     Days per week: Not on file     Minutes per session: Not on file     Stress: Not on file   Relationships     Social connections:     Talks on phone: Not on file     Gets together: Not on file     Attends Orthodoxy service: Not on file     Active member of club or organization: Not on file     Attends meetings of clubs or organizations: Not on file     Relationship status: Not on file     Intimate partner violence:     Fear of current or ex partner: Not on file     Emotionally abused: Not on file     Physically abused: Not on file     Forced sexual activity: Not on file   Other Topics Concern     Not on file   Social History Narrative     Not on file             Objective:     Vitals:    06/04/19 0707   BP: 136/72   Pulse: 78   SpO2: 100%   Weight: 147 lb 12.8 oz (67 kg)   Height: 5' 4.5\" (1.638 m)         Physical Exam:  EYES: Eyelids, conjunctiva, and sclera were normal. Pupils were normal. Cornea, iris, and lens were normal bilaterally.  HEAD, EARS, NOSE, MOUTH, AND THROAT: Head and face were normal. Hearing was normal to voice and the ears were normal to external exam. Nose appearance was normal and there was no discharge. Oropharynx was normal.  TMs were normal.  NECK: Neck appearance was normal. There were no neck masses and the thyroid was not enlarged.  RESPIRATORY: Breathing pattern was normal and the chest moved symmetrically.   Lung sounds were equal bilaterally.  CARDIOVASCULAR: Heart rate and rhythm were normal.  S1 and S2 were normal and there were no extra sounds or murmurs. Peripheral pulses in arms and legs were normal.  Jugular venous pressure was " normal.  There was no peripheral edema.  GASTROINTESTINAL: The abdomen was normal in contour.  Bowel sounds were present.   Palpation detected no tenderness, mass, or enlarged organs.   MUSCULOSKELETAL: Skeletal configuration was normal and muscle mass was normal for age. Joint appearance was overall normal.  LYMPHATIC: There were no enlarged nodes.  SKIN/HAIR/NAILS: Skin color was normal.  There were no abnormal skin lesions.  Hair and nails were normal.  NEUROLOGIC: The patient was alert and oriented to person, place, time, and circumstance. Speech was normal. Cranial nerves were normal. Motor strength was normal for age. The patient was normally coordinated.  PSYCHIATRIC:  Mood and affect were normal and the patient had normal recent and remote memory. The patient's judgment and insight were normal.          There are no Patient Instructions on file for this visit.        Labs: Laboratory studies are pending  Recent Results (from the past 24 hour(s))   Electrocardiogram Perform and Read    Collection Time: 06/04/19  7:17 AM   Result Value Ref Range    SYSTOLIC BLOOD PRESSURE  mmHg    DIASTOLIC BLOOD PRESSURE  mmHg    VENTRICULAR RATE 77 BPM    ATRIAL RATE 77 BPM    P-R INTERVAL 180 ms    QRS DURATION 90 ms    Q-T INTERVAL 370 ms    QTC CALCULATION (BEZET) 418 ms    P Axis -5 degrees    R AXIS 5 degrees    T AXIS 13 degrees    MUSE DIAGNOSIS       Normal sinus rhythm  Normal ECG  When compared with ECG of 29-DEC-2017 08:03,  No significant change was found         Immunization History   Administered Date(s) Administered     Influenza high dose, seasonal 11/04/2015, 12/29/2017     Influenza, inj, historic,unspecified 11/05/2008, 09/29/2009, 10/25/2010, 12/15/2011     Influenza, seasonal,quad inj 6-35 mos 12/20/2013     Pneumo Conj 13-V (2010&after) 03/24/2017     Pneumo Polysac 23-V 12/20/2013     Td, adult adsorbed, PF 03/12/2019     Td,adult,historic,unspecified 06/01/1999, 09/29/2009     Tdap 09/29/2009      ZOSTER, LIVE 12/20/2013           Electronically signed by Radha Rodriguez MD 06/04/19 7:10 AM

## 2021-05-29 NOTE — ANESTHESIA PREPROCEDURE EVALUATION
Anesthesia Evaluation      Patient summary reviewed   No history of anesthetic complications     Airway   Mallampati: I  Neck ROM: full   Pulmonary - negative ROS and normal exam                          Cardiovascular - normal exam  (+) hypertension, , PVD     Neuro/Psych - negative ROS     Endo/Other - negative ROS      GI/Hepatic/Renal - negative ROS           Dental - normal exam                        Anesthesia Plan  Planned anesthetic: general endotracheal    ASA 3   Induction: intravenous   Anesthetic plan and risks discussed with: patient    Post-op plan: routine recovery

## 2021-05-30 VITALS — WEIGHT: 175.8 LBS | BODY MASS INDEX: 29.29 KG/M2 | HEIGHT: 65 IN

## 2021-05-30 NOTE — PROGRESS NOTES
"Chief Complaint   Patient presents with     Cough       Initial BP (!) 82/60  Pulse 104  Temp 96.6  F (35.9  C) (Tympanic)  Resp 20  Wt 40 lb (18.1 kg)  SpO2 95% Estimated body mass index is 16.07 kg/(m^2) as calculated from the following:    Height as of 2/14/18: 3' 4.5\" (1.029 m).    Weight as of 2/14/18: 37 lb 8 oz (17 kg).  BP completed using cuff size: pediatric     Jennifer Salas MA      " Jorge Morgan.  The vaginal sample was negative.  If you do not have any improvement with the fluconazole, then I do recommend you start using the clobetasol ointment.  Let me know if you have questions and I hope you have a good trip!  ULYSSES

## 2021-05-30 NOTE — TELEPHONE ENCOUNTER
Refill Approved (for losartan).  Hctz however is a historical med; requires PCP transmittal for continuation.  Thank you-    Rx-losartan renewed per Medication Renewal Policy. Medication was last renewed on 4/19/2018.  Last OV 7/11/2019.    Caty Varner, [facility] Connection Triage/Med Refill 7/15/2019     Requested Prescriptions   Pending Prescriptions Disp Refills     losartan (COZAAR) 25 MG tablet [Pharmacy Med Name: LOSARTAN POTASSIUM 25 MG TAB] 90 tablet 2     Sig: TAKE 1 TABLET (25 MG TOTAL) BY MOUTH DAILY.       Angiotensin Receptor Blocker Protocol Passed - 7/15/2019  1:47 AM        Passed - PCP or prescribing provider visit in past 12 months       Last office visit with prescriber/PCP: Visit date not found OR same dept: Visit date not found OR same specialty: 7/11/2019 Sadia Wynne MD  Last physical: 6/4/2019 Last MTM visit: Visit date not found   Next visit within 3 mo: Visit date not found  Next physical within 3 mo: Visit date not found  Prescriber OR PCP: Radha Rodriguez MD  Last diagnosis associated with med order: 1. Essential hypertension  - losartan (COZAAR) 25 MG tablet [Pharmacy Med Name: LOSARTAN POTASSIUM 25 MG TAB]; Take 1 tablet (25 mg total) by mouth daily.  Dispense: 90 tablet; Refill: 2  - hydroCHLOROthiazide (HYDRODIURIL) 25 MG tablet [Pharmacy Med Name: HYDROCHLOROTHIAZIDE 25 MG TAB]; Take 1 tablet (25 mg total) by mouth daily.  Dispense: 90 tablet; Refill: 2    If protocol passes may refill for 12 months if within 3 months of last provider visit (or a total of 15 months).             Passed - Serum potassium within the past 12 months     Lab Results   Component Value Date    Potassium 4.8 06/04/2019             Passed - Blood pressure filed in past 12 months     BP Readings from Last 1 Encounters:   07/11/19 140/80             Passed - Serum creatinine within the past 12 months     Creatinine   Date Value Ref Range Status   06/04/2019 0.71 0.60 - 1.10 mg/dL Final              hydroCHLOROthiazide (HYDRODIURIL) 25 MG tablet [Pharmacy Med Name: HYDROCHLOROTHIAZIDE 25 MG TAB] 90 tablet 2     Sig: TAKE 1 TABLET (25 MG TOTAL) BY MOUTH DAILY.       Diuretics/Combination Diuretics Refill Protocol  Passed - 7/15/2019  1:47 AM        Passed - Visit with PCP or prescribing provider visit in past 12 months     Last office visit with prescriber/PCP: Visit date not found OR same dept: Visit date not found OR same specialty: 7/11/2019 Sadia Wynne MD  Last physical: 6/4/2019 Last MTM visit: Visit date not found   Next visit within 3 mo: Visit date not found  Next physical within 3 mo: Visit date not found  Prescriber OR PCP: Radha Rodriguez MD  Last diagnosis associated with med order: 1. Essential hypertension  - losartan (COZAAR) 25 MG tablet [Pharmacy Med Name: LOSARTAN POTASSIUM 25 MG TAB]; Take 1 tablet (25 mg total) by mouth daily.  Dispense: 90 tablet; Refill: 2  - hydroCHLOROthiazide (HYDRODIURIL) 25 MG tablet [Pharmacy Med Name: HYDROCHLOROTHIAZIDE 25 MG TAB]; Take 1 tablet (25 mg total) by mouth daily.  Dispense: 90 tablet; Refill: 2    If protocol passes may refill for 12 months if within 3 months of last provider visit (or a total of 15 months).             Passed - Serum Potassium in past 12 months      Lab Results   Component Value Date    Potassium 4.8 06/04/2019             Passed - Serum Sodium in past 12 months      Lab Results   Component Value Date    Sodium 134 (L) 06/04/2019             Passed - Blood pressure on file in past 12 months     BP Readings from Last 1 Encounters:   07/11/19 140/80             Passed - Serum Creatinine in past 12 months      Creatinine   Date Value Ref Range Status   06/04/2019 0.71 0.60 - 1.10 mg/dL Final

## 2021-05-30 NOTE — TELEPHONE ENCOUNTER
Refill NOT Needed.  Medication last renewed on 7/15/19.  Noted in refusal transmittal to pharmacy.    Caty Varner, [facility] Connection Triage/Med Refill 7/16/2019     Requested Prescriptions   Pending Prescriptions Disp Refills     losartan (COZAAR) 25 MG tablet [Pharmacy Med Name: LOSARTAN POTASSIUM 25 MG TAB] 90 tablet 2     Sig: TAKE 1 TABLET (25 MG TOTAL) BY MOUTH DAILY.       Angiotensin Receptor Blocker Protocol Passed - 7/15/2019 11:13 AM        Passed - PCP or prescribing provider visit in past 12 months       Last office visit with prescriber/PCP: Visit date not found OR same dept: Visit date not found OR same specialty: 7/11/2019 Sadia Wynne MD  Last physical: 6/4/2019 Last MTM visit: Visit date not found   Next visit within 3 mo: Visit date not found  Next physical within 3 mo: Visit date not found  Prescriber OR PCP: Radha Rodriguez MD  Last diagnosis associated with med order: 1. Essential hypertension  - losartan (COZAAR) 25 MG tablet [Pharmacy Med Name: LOSARTAN POTASSIUM 25 MG TAB]; Take 1 tablet (25 mg total) by mouth daily.  Dispense: 90 tablet; Refill: 2    If protocol passes may refill for 12 months if within 3 months of last provider visit (or a total of 15 months).             Passed - Serum potassium within the past 12 months     Lab Results   Component Value Date    Potassium 4.8 06/04/2019             Passed - Blood pressure filed in past 12 months     BP Readings from Last 1 Encounters:   07/11/19 140/80             Passed - Serum creatinine within the past 12 months     Creatinine   Date Value Ref Range Status   06/04/2019 0.71 0.60 - 1.10 mg/dL Final

## 2021-05-30 NOTE — TELEPHONE ENCOUNTER
Refill Approved    Rx renewed per Medication Renewal Policy. Medication was last renewed on 6/6/18.    Jacey Kwan, [facility] Connection Triage/Med Refill 7/23/2019     Requested Prescriptions   Pending Prescriptions Disp Refills     valACYclovir (VALTREX) 1000 MG tablet [Pharmacy Med Name: VALACYCLOVIR HCL 1 GRAM TABLET] 90 tablet 2     Sig: TAKE 1 TABLET (1,000 MG TOTAL) BY MOUTH DAILY.       Antivirals Refill Protocol Passed - 7/23/2019  1:46 AM        Passed - Renal function done in last year     Creatinine   Date Value Ref Range Status   06/04/2019 0.71 0.60 - 1.10 mg/dL Final             Passed - Visit with PCP or prescribing provider visit in past 12 months or next 3 months     Last office visit with prescriber/PCP: Visit date not found OR same dept: Visit date not found OR same specialty: 7/11/2019 Sadia Wynne MD  Last physical: 6/4/2019 Last MTM visit: Visit date not found   Next visit within 3 mo: Visit date not found  Next physical within 3 mo: Visit date not found  Prescriber OR PCP: Radha Rodriguez MD  Last diagnosis associated with med order: 1. Herpes simplex  - valACYclovir (VALTREX) 1000 MG tablet [Pharmacy Med Name: VALACYCLOVIR HCL 1 GRAM TABLET]; TAKE 1 TABLET (1,000 MG TOTAL) BY MOUTH DAILY.  Dispense: 90 tablet; Refill: 2    If protocol passes may refill for 12 months if within 3 months of last provider visit (or a total of 15 months).

## 2021-05-30 NOTE — PROGRESS NOTES
Office Visit   Cathy Beckwith   74 y.o. female    Date of Visit: 7/11/2019    Chief Complaint   Patient presents with     Vaginal Itching     Off and onf for a couple months     Check spot on upper right arm        Assessment and Plan   1. Vaginal itching  Of unclear etiology.  I do not see a lot of discharge however I will send a sample for yeast.  She was on Augmentin in May.  We also discussed that this may be due to lichen sclerosis as there is some mild thickening which also could be due to itching.  I am going to give her a prescription for Diflucan to take today.  If her test for yeast is negative and she is not seeing improvement in her symptoms in the next week then I will have her start clobetasol instead.  I have given her this prescription as well.  She understands and is in agreement with this plan.  - fluconazole (DIFLUCAN) 150 MG tablet; Take 1 tablet (150 mg total) by mouth once for 1 dose.  Dispense: 2 tablet; Refill: 0  - clobetasol (TEMOVATE) 0.05 % ointment; Use daily for 2 weeks for vaginal itching, then may use 2-3 times a week if needed.  Dispense: 30 g; Refill: 3  - Wet Prep, Vaginal    2. Skin lesion  And on her right arm that looks like a possible basal cell skin cancer.  I am going to refer her to dermatology.  - Ambulatory referral to Dermatology       No follow-ups on file.     History of Present Illness   This 74 y.o. old female comes in for a couple of concerns.  Over the past month and a half she has had increased vaginal itching.  She has not noticed any discharge or other abnormalities.  Feels its worsening.  She was treated with Augmentin for sinusitis in May and then did have sinus surgery which has been very helpful.  She has no other recent new symptoms with this.  She also notes a skin lesion on her right arm she would like to have checked.    Review of Systems: As above, systems otherwise reviewed and negative.     Medications, Allergies and Problem List   Patient Active  Problem List   Diagnosis     Lower Back Pain Chronic     Hypercholesterolemia     Hypertension     Allergic Rhinitis     Status post total replacement of left shoulder     Atherosclerosis of both carotid arteries     Current Outpatient Medications   Medication Sig Dispense Refill     aspirin 81 MG EC tablet Take 81 mg by mouth daily.       atorvastatin (LIPITOR) 80 MG tablet TAKE 1 TABLET BY MOUTH AT BEDTIME 90 tablet 3     cholecalciferol, vitamin D3, 1,000 unit tablet Take 2,000 Units by mouth every evening.       docusate sodium (COLACE) 100 MG capsule Take 1 capsule (100 mg total) by mouth 2 (two) times a day as needed for constipation. (Patient taking differently: Take 100 mg by mouth 2 (two) times a day as needed for constipation. Takes 1 tablet every other day      )  0     hydroCHLOROthiazide (HYDRODIURIL) 25 MG tablet Take 12.5 mg by mouth every evening.              losartan (COZAAR) 25 MG tablet Take 1 tablet (25 mg total) by mouth daily. 90 tablet 2     naproxen sodium (ALEVE) 220 MG tablet Take 220 mg by mouth 2 (two) times a day with meals.       triamcinolone acetonide (NASACORT AQ NASL) into each nostril.       valACYclovir (VALTREX) 1000 MG tablet TAKE 1 TABLET (1,000 MG TOTAL) BY MOUTH DAILY. (Patient taking differently: TAKE 1 TABLET 500mg daily) 90 tablet 2     clobetasol (TEMOVATE) 0.05 % ointment Use daily for 2 weeks for vaginal itching, then may use 2-3 times a week if needed. 30 g 3     fluconazole (DIFLUCAN) 150 MG tablet Take 1 tablet (150 mg total) by mouth once for 1 dose. 2 tablet 0     traMADol (ULTRAM) 50 mg tablet Take 1 tablet (50 mg total) by mouth every 6 (six) hours as needed for severe pain (7-10). 15 tablet 0     No current facility-administered medications for this visit.      Allergies   Allergen Reactions     Ace Inhibitors      Didn't feel well     Codeine Nausea Only          Physical Exam     /80 (Patient Site: Right Arm, Patient Position: Sitting)   Pulse 90   Ht  "5' 4.5\" (1.638 m)   Wt 144 lb (65.3 kg)   SpO2 96%   BMI 24.34 kg/m      General: This is an alert female, sitting comfortably, no apparent distress.  Skin: On her right upper arm there is a nodular reddish skin lesion.  Genital: Some mild reddening and inflammation in the labial area not able to see any discharge or other abnormalities.     Additional Information   Social History     Tobacco Use     Smoking status: Never Smoker     Smokeless tobacco: Never Used   Substance Use Topics     Alcohol use: Yes     Comment: 4 glasses a week     Drug use: No            Sadia Wynne MD    "

## 2021-05-30 NOTE — PROGRESS NOTES
HPI: This patient is a 73yo F who presents for a post-op visit s/p FESS. Doing well overall. Reports improved nasal breathing, decreased pressure and headaches, and denies problems with bleeding. Smell is slowly starting to filter back in.    Past medical history, past surgical history, social history, medications, and allergies have been reviewed with the patient and are documented above.    Review of Systems: an ENT specific review of systems is normal    PHYSICAL EXAMINATION:  GEN: no acute distress, normocephalic  NOSE: nares patent, septum non-obstucting  NASAL ENDOSCOPY: OMCs patent, Propel stents in place. No visible polyp recurrence. No crusting  NECK: soft and supple. No lymphadenopathy or masses. Airway is midline.  PULM: breathing comfortably on room air, normal chest expansion with respiration    MEDICAL DECISION-MAKING: doing well s/p FESS. Will be okay to resume full activity and nasal steroid sprays. Continue nasal saline irrigation. RTC in 1 month.

## 2021-05-31 ENCOUNTER — RECORDS - HEALTHEAST (OUTPATIENT)
Dept: ADMINISTRATIVE | Facility: CLINIC | Age: 76
End: 2021-05-31

## 2021-05-31 VITALS — WEIGHT: 170 LBS | HEIGHT: 64 IN | BODY MASS INDEX: 29.02 KG/M2

## 2021-05-31 VITALS — BODY MASS INDEX: 28.57 KG/M2 | WEIGHT: 171.5 LBS | HEIGHT: 65 IN

## 2021-06-01 NOTE — PROGRESS NOTES
HPI: This patient is a 73yo F who presents for a post-op visit s/p FESS for ethmoidal polyposis. Doing well overall. Reports improved nasal breathing, decreased pressure and headaches, and denies problems with bleeding. Smell continues to improve. Propel stents are out.     Past medical history, past surgical history, social history, medications, and allergies have been reviewed with the patient and are documented above.     Review of Systems: an ENT specific review of systems is normal     PHYSICAL EXAMINATION:  GEN: no acute distress, normocephalic  NOSE: nares patent, septum non-obstucting but with a posterior spur to the right in contact with the posterior inferior turbinate  NASAL ENDOSCOPY: OMCs widely patent, no visible polyps or purulence. No crusting.  NECK: soft and supple. No lymphadenopathy or masses. Airway is midline.  PULM: breathing comfortably on room air, normal chest expansion with respiration     MEDICAL DECISION-MAKING: doing well s/p FESS. Continue nasal steroid sprays. RTC in 1 year.

## 2021-06-01 NOTE — TELEPHONE ENCOUNTER
Medication Request  Medication name: Proctocol-HC cream  Pharmacy Name and Location:  [facility] #[id number]  Reason for request: Patient going to the [facility] for one week and wants to have this medication with her.  When did you use medication last?:  Patient has used cream in the past but does not recall when.  Patient offered appointment:  patient declined  Okay to leave a detailed message: yes

## 2021-06-02 VITALS — WEIGHT: 150.31 LBS | HEIGHT: 65 IN | BODY MASS INDEX: 25.04 KG/M2

## 2021-06-03 ENCOUNTER — RECORDS - HEALTHEAST (OUTPATIENT)
Dept: ADMINISTRATIVE | Facility: CLINIC | Age: 76
End: 2021-06-03

## 2021-06-03 VITALS — BODY MASS INDEX: 24.49 KG/M2 | WEIGHT: 147 LBS | HEIGHT: 65 IN

## 2021-06-03 VITALS — BODY MASS INDEX: 23.99 KG/M2 | HEIGHT: 65 IN | WEIGHT: 144 LBS

## 2021-06-03 VITALS — WEIGHT: 147.8 LBS | BODY MASS INDEX: 24.62 KG/M2 | HEIGHT: 65 IN

## 2021-06-03 VITALS — HEIGHT: 65 IN | BODY MASS INDEX: 25.33 KG/M2 | WEIGHT: 152 LBS

## 2021-06-04 VITALS
BODY MASS INDEX: 22.81 KG/M2 | DIASTOLIC BLOOD PRESSURE: 88 MMHG | WEIGHT: 135 LBS | TEMPERATURE: 98.7 F | HEART RATE: 92 BPM | SYSTOLIC BLOOD PRESSURE: 164 MMHG | RESPIRATION RATE: 12 BRPM

## 2021-06-06 NOTE — TELEPHONE ENCOUNTER
Refill Approved    Rx renewed per Medication Renewal Policy. Medication was last renewed on 2/20/19.    Maria D Manjarrez, [facility] Connection Triage/Med Refill 2/14/2020     Requested Prescriptions   Pending Prescriptions Disp Refills     atorvastatin (LIPITOR) 80 MG tablet [Pharmacy Med Name: ATORVASTATIN 80 MG TABLET] 90 tablet 3     Sig: TAKE 1 TABLET BY MOUTH EVERYDAY AT BEDTIME       Statins Refill Protocol (Hmg CoA Reductase Inhibitors) Passed - 2/12/2020 12:03 PM        Passed - PCP or prescribing provider visit in past 12 months      Last office visit with prescriber/PCP: Visit date not found OR same dept: Visit date not found OR same specialty: 7/11/2019 Sadia Wynne MD  Last physical: 6/4/2019 Last MTM visit: Visit date not found   Next visit within 3 mo: Visit date not found  Next physical within 3 mo: Visit date not found  Prescriber OR PCP: Radha Rodriguez MD  Last diagnosis associated with med order: 1. Pure hypercholesterolemia  - atorvastatin (LIPITOR) 80 MG tablet [Pharmacy Med Name: ATORVASTATIN 80 MG TABLET]; TAKE 1 TABLET BY MOUTH EVERYDAY AT BEDTIME  Dispense: 90 tablet; Refill: 3    If protocol passes may refill for 12 months if within 3 months of last provider visit (or a total of 15 months).

## 2021-06-09 NOTE — PROGRESS NOTES
Assessment and Plan:     1. Preventative health care  Will update immunizations by providing Prevnar 13.  Colon cancer screening due in 2018 and will consider Colocort.  Bone density and mammography ordered.  Laboratory studies updated.  Lifestyle: Have discussed importance of calcium, vitamin D along with weight bearing and balance exercise for bone health.  Have discussed importance of regular exercise and healthy diet  - DXA Bone Density Scan; Future  - Mammo Screening Bilateral; Future    2. Hypercholesterolemia  2 for an update on labs  - Lipid [place] FASTING  - Thyroid Stimulating Hormone (TSH)    3. Essential hypertension with goal blood pressure less than 140/90  At goal on current medications we'll renew for one year    4. Medication monitoring encounter  We'll update labs  - Comprehensive Metabolic Panel  - HM2(CBC w/o Differential)    5. Vitamin D deficiency  By history.  Recommend 2000 IUs daily.  We'll update vitamin D  - Vitamin D, Total (25-Hydroxy)    8. Arthritis  Left shoulder arthritis.  Of note, she had seen Dr. Romero in the past to recommended shoulder replacement.  She has since had a couple of injections.  She is not yet ready for that.  Recommendation: Course of physical therapy for strengthening  - Ambulatory referral to Physical Therapy  - atorvastatin (LIPITOR) 40 MG tablet; TAKE 1 TABLET (40 MG TOTAL) BY MOUTH BEDTIME.  Dispense: 90 tablet; Refill: 3    9. Herpes simplex  On preventative Valtrex.  We'll reorder  - valACYclovir (VALTREX) 1000 MG tablet; TAKE 1 TABLET (1,000 MG TOTAL) BY MOUTH DAILY.  Dispense: 90 tablet; Refill: 2        Radha Rodriguez MD  3/24/2017    Chief Complaint:  Chief Complaint   Patient presents with     Annual Exam     fasting       History of Present Illness:  Cathy is a 71 y.o. female who is here today for preventative healthcare.  Of note she has not been seen in 4 years.  She states she has not received healthcare at any other location.  She is doing  "well.  She has no chief complaints.  She states she continues to work for the [employer].  She is enjoying her job but looking to retire at some point in the future.    With regard to health maintenance, she has not kept up much.  She is up-to-date on ophthalmologic and dental care.  Lifestyle discussed.  She is aware that she needs to lose weight.  She does not exercise regularly.  Have discussed the importance of calcium, vitamin D, weight-bearing and balance exercises    Review of Systems:   Aside from that mentioned above, The rest of the review of systems are negative for all systems.    PFSH:  Social History: She is .  She has adult children.  She is a nonsmoker.  She drinks 1-1/2 glasses of wine per day.  She drinks 3-4 cups of coffee per week  History   Smoking Status     Never Smoker   Smokeless Tobacco     Not on file       Past Medical History: As noted in the snapshot  Allergies   Allergen Reactions     Ace Inhibitors      Codeine        Family History: Updated today  Family History   Problem Relation Age of Onset     Heart disease Mother      Hyperlipidemia Mother      Hypertension Father      COPD Father      Stroke Father      Heart disease Sister      Anorexia nervosa Sister        Physical Exam:  Vitals:    03/24/17 0924   BP: 134/82   Pulse: 80   Weight: 175 lb 12.8 oz (79.7 kg)   Height: 5' 4.5\" (1.638 m)     Wt Readings from Last 3 Encounters:   03/24/17 175 lb 12.8 oz (79.7 kg)       General Appearance:  Alert, cooperative, no distress, appears stated age   Head:  Normocephalic, without obvious abnormality, atraumatic   Eyes:  PERRL, conjunctiva/corneas clear, EOM's intact   Ears:  Normal TM's and external ear canals, both ears   Nose: Nares normal, septum midline,mucosa normal, no drainage    Throat: Lips, mucosa, and tongue normal; teeth and gums normal   Neck: Supple, symmetrical, trachea midline, no adenopathy;  thyroid: not enlarged, symmetric, no tenderness/mass/nodules; no carotid " bruit or JVD   Back:   Symmetric, no curvature, ROM normal, no CVA tenderness   Lungs:   Clear to auscultation bilaterally, respirations unlabored   Breasts:  No breast masses, tenderness, asymmetry, or nipple discharge.   Heart:  Regular rate and rhythm, S1 and S2 normal, no murmur, rub, or gallop   Abdomen:   Soft, non-tender, bowel sounds active all four quadrants,  no masses, no organomegaly   Genitalia: Normally developed genitalia with no external lesions or eruptions.  Vagina and cervix show no lesions, inflammation, discharge or tenderness.  No cystocele.  Uterus normal size and position.  No adnexal mass or tenderness.   Rectal:  No hemorrhoids noted externally    Extremities: Extremities normal, atraumatic, no cyanosis or edema   Skin: Skin color, texture, turgor normal, no rashes or lesions   Lymph nodes: Cervical, supraclavicular, and axillary nodes normal   Neurologic: Normal, CN 2-12 intact     Medications:  Current Outpatient Prescriptions   Medication Sig Dispense Refill     atorvastatin (LIPITOR) 40 MG tablet TAKE 1 TABLET (40 MG TOTAL) BY MOUTH BEDTIME. 90 tablet 3     fluticasone (FLONASE) 50 mcg/actuation nasal spray INSTILL 2 SPRAYS INTO EACH NOSTRIL ONE PER DAY 0.5 g 4     hydroCHLOROthiazide (HYDRODIURIL) 25 MG tablet TAKE 1 TABLET BY MOUTH EVERY DAY 90 tablet 3     losartan (COZAAR) 25 MG tablet Take 1 tablet (25 mg total) by mouth daily. 90 tablet 3     valACYclovir (VALTREX) 1000 MG tablet TAKE 1 TABLET (1,000 MG TOTAL) BY MOUTH DAILY. 90 tablet 2     Current Facility-Administered Medications   Medication Dose Route Frequency Provider Last Rate Last Dose     pneumococcal conj. 13-valent vaccine 0.5 mL (PREVNAR-13)  0.5 mL Intramuscular Once Radha Rodriguez MD           Immunizations:  Immunization History   Administered Date(s) Administered     Influenza, inj, historic 11/05/2008, 09/29/2009, 10/25/2010, 12/15/2011     Influenza, seasonal,quad inj 6-35 mos 12/20/2013     Pneumo Polysac  23-V 12/20/2013     Td, historic 06/01/1999, 09/29/2009     Tdap 09/29/2009     ZOSTER 12/20/2013

## 2021-06-10 NOTE — PROGRESS NOTES
Preoperative Exam    Scheduled Procedure: Cataract surgery  Surgery Date:  8/11/2020  Surgery Location: [facility] [place]    Surgeon:  Dr. De Luna    Assessment/Plan:     1. Pre-op exam    - Basic Metabolic Panel  - HM2(CBC w/o Differential); Future    2. Cataract of both eyes, unspecified cataract type    - Basic Metabolic Panel  - HM2(CBC w/o Differential); Future     Surgical Procedure Risk: Low (reported cardiac risk generally < 1%)  Have you had prior anesthesia?: Yes  Have you or any family members had a previous anesthesia reaction:  No  Do you or any family members have a history of a clotting or bleeding disorder?: No  Cardiac Risk Assessment: no increased risk for major cardiac complications    APPROVAL GIVEN to proceed with proposed procedure, without further diagnostic evaluation    Please Note:  no apnea    Functional Status: Independent  Patient plans to recover at home with family.     Subjective:      Cathy Beckwith is a 75 y.o. female who presents for a preoperative consultation.  She has no history of CAD, Diabetes, COPD, Asthma. She is a non-smoker.     All other systems reviewed and are negative, other than those listed in the HPI.    Pertinent History  Do you have difficulty breathing or chest pain after walking up a flight of stairs: No  History of obstructive sleep apnea: No  Steroid use in the last 6 months: No  Frequent Aspirin/NSAID use: Yes: Aspirin  Prior Blood Transfusion: No  Prior Blood Transfusion Reaction: No  If for some reason prior to, during or after the procedure, if it is medically indicated, would you be willing to have a blood transfusion?:  There is no transfusion refusal.    Current Outpatient Medications   Medication Sig Dispense Refill     aspirin 81 MG EC tablet Take 81 mg by mouth daily.       atorvastatin (LIPITOR) 80 MG tablet TAKE 1 TABLET BY MOUTH EVERYDAY AT BEDTIME 90 tablet 2     cholecalciferol, vitamin D3, 1,000 unit tablet Take 2,000 Units by mouth  every evening.       docusate sodium (COLACE) 100 MG capsule Take 1 capsule (100 mg total) by mouth 2 (two) times a day as needed for constipation. (Patient taking differently: Take 100 mg by mouth 2 (two) times a day as needed for constipation. Takes 1 tablet every other day      )  0     hydroCHLOROthiazide (HYDRODIURIL) 25 MG tablet Take 12.5 mg by mouth every evening.              hydroCHLOROthiazide (HYDRODIURIL) 25 MG tablet TAKE 1 TABLET (25 MG TOTAL) BY MOUTH DAILY. 90 tablet 2     hydrocortisone (PROCTOSOL HC) 2.5 % rectal cream Use as directed daily as needed 28.35 g 3     losartan (COZAAR) 25 MG tablet TAKE 1 TABLET BY MOUTH EVERY DAY 90 tablet 3     naproxen sodium (ALEVE) 220 MG tablet Take 220 mg by mouth 2 (two) times a day with meals.       triamcinolone acetonide (NASACORT AQ NASL) into each nostril.       valACYclovir (VALTREX) 1000 MG tablet TAKE 1 TABLET BY MOUTH EVERY DAY 90 tablet 3     clobetasol (TEMOVATE) 0.05 % ointment Use daily for 2 weeks for vaginal itching, then may use 2-3 times a week if needed. 30 g 3     traMADol (ULTRAM) 50 mg tablet Take 1 tablet (50 mg total) by mouth every 6 (six) hours as needed for severe pain (7-10). 15 tablet 0     No current facility-administered medications for this visit.         Allergies   Allergen Reactions     Ace Inhibitors      Didn't feel well     Codeine Nausea Only       Patient Active Problem List   Diagnosis     Lower Back Pain Chronic     Hypercholesterolemia     Hypertension     Allergic Rhinitis     Status post total replacement of left shoulder     Atherosclerosis of both carotid arteries       Past Medical History:   Diagnosis Date     Allergic rhinitis      Hyperlipidemia      Hypertension        Past Surgical History:   Procedure Laterality Date     BUNIONECTOMY       FL LIGATE FALLOPIAN TUBE      Description: Tubal Ligation;  Recorded: 09/29/2009;     FL NASAL/SINUS ENDOSCOPY,BX/RMV POLYP/DEBRID Bilateral 6/18/2019    Procedure:  BILATERAL FUNCTIONAL ENDOSCOPIC SINUS SURGERY;  Surgeon: Maria D Mccoy MD;  Location: [facility];  Service: ENT     DC RECONSTR TOTAL SHOULDER IMPLANT Left 1/9/2018    Procedure: LEFT TOTAL SHOULDER ARTHROPLASTY;  Surgeon: Freddie Flores MD;  Location: [facility];  Service: Orthopedics       Social History     Socioeconomic History     Marital status:      Spouse name: Not on file     Number of children: Not on file     Years of education: Not on file     Highest education level: Not on file   Occupational History     Not on file   Social Needs     Financial resource strain: Not on file     Food insecurity     Worry: Not on file     Inability: Not on file     Transportation needs     Medical: Not on file     Non-medical: Not on file   Tobacco Use     Smoking status: Never Smoker     Smokeless tobacco: Never Used   Substance and Sexual Activity     Alcohol use: Yes     Comment: 4 glasses a week     Drug use: No     Sexual activity: Not on file   Lifestyle     Physical activity     Days per week: Not on file     Minutes per session: Not on file     Stress: Not on file   Relationships     Social connections     Talks on phone: Not on file     Gets together: Not on file     Attends Restoration service: Not on file     Active member of club or organization: Not on file     Attends meetings of clubs or organizations: Not on file     Relationship status: Not on file     Intimate partner violence     Fear of current or ex partner: Not on file     Emotionally abused: Not on file     Physically abused: Not on file     Forced sexual activity: Not on file   Other Topics Concern     Not on file   Social History Narrative     Not on file             Objective:     Vitals:    07/31/20 1304   BP: 164/88   Pulse: 92   Resp: 12   Temp: 98.7  F (37.1  C)   TempSrc: Oral   Weight: 135 lb (61.2 kg)         Physical Exam:  Alert, cooperative, well-hydrated.  Appears well.  Eyes: Pupils equal, round,  reactive to light.  HEENT: Sclera white, nares patent, MMM   Lungs: Clear to auscultation. No retractions, no increased work of respiration, equal chest rise.   Heart: Regular rate and rhythm, no murmurs, clicks,    Gallops.  Abdomen: Soft, bowel sounds in 4 quadrants with no tenderness to palpation, no organomegaly or masses, no aortic or renal bruits.  Extremities: no tenderness to palpation of gastrocnemius, bilaterally.  Skin: no increased warmth, edema, or erythema of lower legs bilaterally.  Back:  No cervical, thoracic or lumbar tenderness to spinous processes or musculature.    Neuro: pupils equal and reactive to light bilaterally, CN II - XII  intact. M/S 5/5 all extremities. DTR 2/4 all extremities.      There are no Patient Instructions on file for this visit.            Immunization History   Administered Date(s) Administered     Influenza high dose,seasonal,PF, 65+ yrs 11/04/2015, 12/29/2017, 10/10/2019     Influenza, inj, historic,unspecified 11/05/2008, 09/29/2009, 10/25/2010, 12/15/2011     Influenza, seasonal,quad inj 6-35 mos 12/20/2013     Pneumo Conj 13-V (2010&after) 03/24/2017     Pneumo Polysac 23-V 12/20/2013     Td, adult adsorbed, PF 03/12/2019     Td,adult,historic,unspecified 06/01/1999, 09/29/2009     Tdap 09/29/2009     ZOSTER, LIVE 12/20/2013           Electronically signed by Wandy Paulino PA-C 07/31/20 1:07 PM

## 2021-06-10 NOTE — TELEPHONE ENCOUNTER
Refill Approved    Rx renewed per Medication Renewal Policy. Medication was last renewed on pt seen 7/31/20.    Jacey Kwan, [facility] Triage/Med Refill 8/25/2020     Requested Prescriptions   Pending Prescriptions Disp Refills     hydroCHLOROthiazide (HYDRODIURIL) 25 MG tablet [Pharmacy Med Name: HYDROCHLOROTHIAZIDE 25 MG TAB] 90 tablet 2     Sig: TAKE 1 TABLET BY MOUTH EVERY DAY       Diuretics/Combination Diuretics Refill Protocol  Failed - 8/23/2020  6:12 AM        Failed - Visit with PCP or prescribing provider visit in past 12 months     Last office visit with prescriber/PCP: Visit date not found OR same dept: Visit date not found OR same specialty: 7/11/2019 Sadia Wynne MD  Last physical: 6/4/2019 Last MTM visit: Visit date not found   Next visit within 3 mo: Visit date not found  Next physical within 3 mo: Visit date not found  Prescriber OR PCP: Radha Rodriguez MD  Last diagnosis associated with med order: 1. Essential hypertension  - hydroCHLOROthiazide (HYDRODIURIL) 25 MG tablet [Pharmacy Med Name: HYDROCHLOROTHIAZIDE 25 MG TAB]; TAKE 1 TABLET BY MOUTH EVERY DAY  Dispense: 90 tablet; Refill: 2    If protocol passes may refill for 12 months if within 3 months of last provider visit (or a total of 15 months).             Passed - Serum Potassium in past 12 months      Lab Results   Component Value Date    Potassium 4.4 07/31/2020             Passed - Serum Sodium in past 12 months      Lab Results   Component Value Date    Sodium 130 (L) 07/31/2020             Passed - Blood pressure on file in past 12 months     BP Readings from Last 1 Encounters:   07/31/20 164/88             Passed - Serum Creatinine in past 12 months      Creatinine   Date Value Ref Range Status   07/31/2020 0.73 0.60 - 1.10 mg/dL Final

## 2021-06-10 NOTE — TELEPHONE ENCOUNTER
RN cannot approve Refill Request    RN can NOT refill this medication Protocol failed and NO refill given. Last office visit: Visit date not found Last Physical: 6/4/2019 Last MTM visit: Visit date not found Last visit same specialty: 7/11/2019 Sadia Wynne MD.  Next visit within 3 mo: Visit date not found  Next physical within 3 mo: Visit date not found      Jacey Kwan, [facility] Triage/Med Refill 7/30/2020    Requested Prescriptions   Pending Prescriptions Disp Refills     valACYclovir (VALTREX) 1000 MG tablet [Pharmacy Med Name: VALACYCLOVIR HCL 1 GRAM TABLET] 90 tablet 3     Sig: TAKE 1 TABLET BY MOUTH EVERY DAY       Antivirals Refill Protocol Failed - 7/28/2020  9:29 AM        Failed - Renal function done in last year     Creatinine   Date Value Ref Range Status   06/04/2019 0.71 0.60 - 1.10 mg/dL Final             Failed - Visit with PCP or prescribing provider visit in past 12 months or next 3 months     Last office visit with prescriber/PCP: Visit date not found OR same dept: Visit date not found OR same specialty: 7/11/2019 Sadia Wynne MD  Last physical: 6/4/2019 Last MTM visit: Visit date not found   Next visit within 3 mo: Visit date not found  Next physical within 3 mo: Visit date not found  Prescriber OR PCP: Radha Rodriguez MD  Last diagnosis associated with med order: 1. Herpes simplex  - valACYclovir (VALTREX) 1000 MG tablet [Pharmacy Med Name: VALACYCLOVIR HCL 1 GRAM TABLET]; TAKE 1 TABLET BY MOUTH EVERY DAY  Dispense: 90 tablet; Refill: 3    2. Essential hypertension  - losartan (COZAAR) 25 MG tablet [Pharmacy Med Name: LOSARTAN POTASSIUM 25 MG TAB]; TAKE 1 TABLET BY MOUTH EVERY DAY  Dispense: 90 tablet; Refill: 3    If protocol passes may refill for 12 months if within 3 months of last provider visit (or a total of 15 months).                losartan (COZAAR) 25 MG tablet [Pharmacy Med Name: LOSARTAN POTASSIUM 25 MG TAB] 90 tablet 3     Sig: TAKE 1 TABLET BY MOUTH EVERY  DAY       Angiotensin Receptor Blocker Protocol Failed - 7/28/2020  9:29 AM        Failed - PCP or prescribing provider visit in past 12 months       Last office visit with prescriber/PCP: Visit date not found OR same dept: Visit date not found OR same specialty: 7/11/2019 Sadia Wynne MD  Last physical: 6/4/2019 Last MTM visit: Visit date not found   Next visit within 3 mo: Visit date not found  Next physical within 3 mo: Visit date not found  Prescriber OR PCP: Radha Rodriguez MD  Last diagnosis associated with med order: 1. Herpes simplex  - valACYclovir (VALTREX) 1000 MG tablet [Pharmacy Med Name: VALACYCLOVIR HCL 1 GRAM TABLET]; TAKE 1 TABLET BY MOUTH EVERY DAY  Dispense: 90 tablet; Refill: 3    2. Essential hypertension  - losartan (COZAAR) 25 MG tablet [Pharmacy Med Name: LOSARTAN POTASSIUM 25 MG TAB]; TAKE 1 TABLET BY MOUTH EVERY DAY  Dispense: 90 tablet; Refill: 3    If protocol passes may refill for 12 months if within 3 months of last provider visit (or a total of 15 months).             Failed - Serum potassium within the past 12 months     No results found for: LN-POTASSIUM          Failed - Serum creatinine within the past 12 months     Creatinine   Date Value Ref Range Status   06/04/2019 0.71 0.60 - 1.10 mg/dL Final             Passed - Blood pressure filed in past 12 months     BP Readings from Last 1 Encounters:   10/10/19 120/70

## 2021-06-12 NOTE — TELEPHONE ENCOUNTER
Refill Approved    Rx renewed per Medication Renewal Policy. Medication was last renewed on 7/31/20 pre op.    Jacey Kwan, [facility] Triage/Med Refill 11/2/2020     Requested Prescriptions   Pending Prescriptions Disp Refills     atorvastatin (LIPITOR) 80 MG tablet [Pharmacy Med Name: ATORVASTATIN 80 MG TABLET] 90 tablet 2     Sig: TAKE 1 TABLET BY MOUTH EVERYDAY AT BEDTIME       Statins Refill Protocol (Hmg CoA Reductase Inhibitors) Failed - 10/29/2020  8:18 AM        Failed - PCP or prescribing provider visit in past 12 months      Last office visit with prescriber/PCP: Visit date not found OR same dept: Visit date not found OR same specialty: 7/11/2019 Sadia Wynne MD  Last physical: 6/4/2019 Last MTM visit: Visit date not found   Next visit within 3 mo: Visit date not found  Next physical within 3 mo: Visit date not found  Prescriber OR PCP: Radha Rodriguez MD  Last diagnosis associated with med order: 1. Pure hypercholesterolemia  - atorvastatin (LIPITOR) 80 MG tablet [Pharmacy Med Name: ATORVASTATIN 80 MG TABLET]; TAKE 1 TABLET BY MOUTH EVERYDAY AT BEDTIME  Dispense: 90 tablet; Refill: 2    If protocol passes may refill for 12 months if within 3 months of last provider visit (or a total of 15 months).

## 2021-06-14 NOTE — TELEPHONE ENCOUNTER
RN cannot approve Refill Request    RN can NOT refill this medication med is not covered by policy/route to provider. Last office visit: Visit date not found Last Physical: 6/4/2019 Last MTM visit: Visit date not found Last visit same specialty: 7/11/2019 Sadia Wynne MD.  Next visit within 3 mo: Visit date not found  Next physical within 3 mo: Visit date not found      Kori Mora, [facility] Triage/Med Refill 1/1/2021    Requested Prescriptions   Pending Prescriptions Disp Refills     hydrocortisone (ANUSOL-HC) 2.5 % rectal cream [Pharmacy Med Name: HYDROCORTISONE 2.5% RECTAL CRM] 30 g 3     Sig: USE AS DIRECTED DAILY AS NEEDED       GI Medications Refill Protocol Failed - 12/31/2020  8:18 AM        Failed - PCP or prescribing provider visit in last 12 or next 3 months.     Last office visit with prescriber/PCP: Visit date not found OR same dept: Visit date not found OR same specialty: 7/11/2019 Sadia Wynne MD  Last physical: 6/4/2019 Last MTM visit: Visit date not found   Next visit within 3 mo: Visit date not found  Next physical within 3 mo: Visit date not found  Prescriber OR PCP: Radha Rodriguez MD  Last diagnosis associated with med order: 1. Hemorrhoids, unspecified hemorrhoid type  - hydrocortisone (ANUSOL-HC) 2.5 % rectal cream [Pharmacy Med Name: HYDROCORTISONE 2.5% RECTAL CRM]; Use as directed daily as needed  Dispense: 30 g; Refill: 3    If protocol passes may refill for 12 months if within 3 months of last provider visit (or a total of 15 months).

## 2021-06-15 NOTE — ANESTHESIA PROCEDURE NOTES
Peripheral Block    Patient location during procedure: pre-op  Start time: 1/9/2018 6:45 AM  End time: 1/9/2018 6:48 AM  post-op analgesia per surgeon order as noted in medical record  Staffing:  Performing  Anesthesiologist: BISI BURROUGHS  Preanesthetic Checklist  Completed: patient identified, site marked, risks, benefits, and alternatives discussed, timeout performed, consent obtained, airway assessed, oxygen available, suction available, emergency drugs available and hand hygiene performed  Peripheral Block  Block type: brachial plexus  Prep: ChloraPrep  Patient position: sitting  Patient monitoring: cardiac monitor, continuous pulse oximetry, heart rate and blood pressure  Laterality: left  Injection technique: ultrasound guided          Needle  Needle type: Stimuplex   Needle gauge: 21 G  Needle length: 4 in  no peripheral nerve catheter placed  Assessment  Injection assessment: no difficulty with injection, negative aspiration for heme, no paresthesia on injection and incremental injection

## 2021-06-15 NOTE — ANESTHESIA POSTPROCEDURE EVALUATION
Patient: Cathy Beckwith  LEFT TOTAL SHOULDER ARTHROPLASTY  Anesthesia type: general    Patient location: PACU  Last vitals:   Vitals:    01/09/18 1030   BP: 137/78   Pulse: 77   Resp: 16   Temp:    SpO2: 99%     Post vital signs: stable  Level of consciousness: awake and responds to simple questions  Post-anesthesia pain: pain controlled  Post-anesthesia nausea and vomiting: no  Pulmonary: unassisted, return to baseline  Cardiovascular: stable and blood pressure at baseline  Hydration: adequate  Anesthetic events: no    QCDR Measures:  ASA# 11 - Kindra-op Cardiac Arrest: ASA11B - Patient did NOT experience unanticipated cardiac arrest  ASA# 12 - Kindra-op Mortality Rate: ASA12B - Patient did NOT die  ASA# 13 - PACU Re-Intubation Rate: ASA13B - Patient did NOT require a new airway mgmt  ASA# 10 - Composite Anes Safety: ASA10A - No serious adverse event    Additional Notes:

## 2021-06-15 NOTE — ANESTHESIA PROCEDURE NOTES
Peripheral Block    Patient location during procedure: pre-op  Start time: 1/9/2018 6:49 AM  End time: 1/9/2018 6:52 AM  post-op analgesia per surgeon order as noted in medical record  Staffing:  Performing  Anesthesiologist: BISI BURROUGHS  Preanesthetic Checklist  Completed: patient identified, site marked, risks, benefits, and alternatives discussed, timeout performed, consent obtained, airway assessed, oxygen available, suction available, emergency drugs available and hand hygiene performed  Peripheral Block  Block type: other, superficial cervical plexus  Prep: ChloraPrep  Patient position: sitting  Patient monitoring: cardiac monitor, continuous pulse oximetry, heart rate and blood pressure  Laterality: left  Injection technique: ultrasound guided    Ultrasound used to visualize needle placement in proximity to nerve being blocked: yes   Permanent ultrasound image captured for medical record    Needle  Needle type: Stimuplex   Needle gauge: 21 G  Needle length: 4 in  no peripheral nerve catheter placed  Assessment  Injection assessment: no difficulty with injection, negative aspiration for heme, no paresthesia on injection and incremental injection

## 2021-06-15 NOTE — PROGRESS NOTES
Preoperative Consultation    Cathy Beckwith   72 y.o.  female    Date of visit: 12/29/2017    This is a preoperative consultation requested by Dr. Flores in preparation for left shoulder replacement for severe DJD on January 9, 2017 at [facility], fax [phone].    Assessment and Plan:  Cathy Beckwith was seen in preoperative consultation in preparation for left shoulder replacements.  This is a low risk surgery and the patient has no increased risk for major cardiac complications based on exam and history and appears to be medically stable for the proposed surgery/procedure.    1. Preop exam for internal medicine  Cathy is likely medically ready for surgery.  She has no symptoms of a cardiovascular nature.  Her history is negative for primary coronary artery disease, type 2 diabetes or chronic kidney disease.  She has no prior history of untoward reactions to anesthesia.  Of note, a carotid bruit is noted on exam today in the left carotid artery.  This will be evaluated preoperatively.    If carotid artery ultrasound is unremarkable, patient will proceed with surgery.  She is advised to avoid aspirin, Motrin, Aleve or any fish oil preparations 7-10 days prior to surgery.  She is advised that she may use Tylenol for pain.    For preoperative medication management, she will hold her losartan hydrochlorothiazide on the day of surgery.  However, she will take half of her losartan dosage on the evening before surgery.    Aftercare will be provided by her  and family.    - Comprehensive Metabolic Panel  - HM2(CBC w/o Differential)  - Electrocardiogram Perform and Read  - Urinalysis-UC if Indicated  -Carotid ultrasound: Carotid ultrasound is reviewed.  There is no significant carotid stenosis to impede surgery.  She does have bilateral plaque    2. Hypertension  Mild blood pressure elevation today secondary to generalized nervousness regarding upcoming surgery    3. Hypercholesterolemia  Update labs:  Of  note, due to the presence of significant atheromatous plaque, will increase Lipitor to 80 mg.  - Lipid Cascade FASTING    4. Bruit of left carotid artery  Intermittent bruit auscultated on the left.  Will evaluate prior to surgery  Bilateral atheromatous plaque is noted.  No significant stenoses.  Patient is safe to proceed with surgery  - US Carotid Bilateral; Future      Patient Active Problem List   Diagnosis     Lower Back Pain Chronic     Hypercholesterolemia     Hypertension     Allergic Rhinitis       HPI:   Cathy is a delightful 72-year-old female who enjoys very good health.  She is eagerly anticipating repair and replacement of her left shoulder which is caused her significant amount of pain and dysfunction.  Of note, otherwise she has no chief complaints.  She denies any chest pain, shortness of breath, bowel or bladder issues.  She denies any exposures to infectious diseases or any current or recent illness.    Pertinent anesthesia history is reviewed: She denies any untoward reactions to general or local anesthetic agents.  She denies hyperthermia, anaphylaxis, nausea or vomiting or skin rashes.  She does not have a history of bleeding disorder or postoperative DVTs.  There is no family history of the same.  Her history is negative for primary coronary artery disease, chronic kidney disease, type 2 diabetes mellitus or arrhythmia.  Of note, she is a non-smoker.  She is very physically active engaging and hiking and high levels of activity.    Review of systems:  A comprehensive review of systems was performed and was otherwise negative    Current Outpatient Prescriptions   Medication Sig Dispense Refill     atorvastatin (LIPITOR) 40 MG tablet TAKE 1 TABLET (40 MG TOTAL) BY MOUTH BEDTIME. 90 tablet 3     fluticasone (FLONASE) 50 mcg/actuation nasal spray INSTILL 2 SPRAYS INTO EACH NOSTRIL ONE PER DAY 48 g 4     hydroCHLOROthiazide (HYDRODIURIL) 25 MG tablet TAKE 1 TABLET BY MOUTH EVERY DAY 90 tablet 3  "    losartan (COZAAR) 25 MG tablet Take 1 tablet (25 mg total) by mouth daily. 90 tablet 3     valACYclovir (VALTREX) 1000 MG tablet TAKE 1 TABLET (1,000 MG TOTAL) BY MOUTH DAILY. 90 tablet 2     No current facility-administered medications for this visit.        Allergies   Allergen Reactions     Ace Inhibitors      Codeine        Recent antiplatelet use: yes Nonsteroidal anti-inflammatory agents which are being held/ aspirin    Steroid use in the past year: no    Past difficulty with anesthesia:  no    Personal or family history of difficulty with anesthesia: no    Current cardiopulmonary symptoms: no        Past Surgical History:   Procedure Laterality Date     BUNIONECTOMY       CO LIGATE FALLOPIAN TUBE      Description: Tubal Ligation;  Recorded: 09/29/2009;     Family History   Problem Relation Age of Onset     Heart disease Mother      Hyperlipidemia Mother      Hypertension Father      COPD Father      Stroke Father      Heart disease Sister      Anorexia nervosa Sister      Social History   Substance Use Topics     Smoking status: Never Smoker     Smokeless tobacco: Not on file     Alcohol use Not on file        Physical Exam:    General Appearance:   She appears well and in no acute distress    Vitals:    12/29/17 0756   BP: 136/78   Patient Site: Left Arm   Patient Position: Sitting   Cuff Size: Adult Regular   Pulse: 88   Weight: 171 lb 8 oz (77.8 kg)   Height: 5' 4.5\" (1.638 m)     Wt Readings from Last 3 Encounters:   12/29/17 171 lb 8 oz (77.8 kg)   03/24/17 175 lb 12.8 oz (79.7 kg)       EYES: Eyelids, conjunctiva, and sclera were normal. Pupils were normal. Cornea, iris, and lens were normal bilaterally.  HEAD, EARS, NOSE, MOUTH, AND THROAT: Head and face were normal. Hearing was normal to voice and the ears were normal to external exam. Nose appearance was normal and there was no discharge. Oropharynx was normal.  TMs were normal.  NECK: Neck appearance was normal. There were no neck masses and the " thyroid was not enlarged.  There is an intermittent bruit noted in the left carotid artery  RESPIRATORY: Breathing pattern was normal and the chest moved symmetrically.   Lung sounds were equal bilaterally.  CARDIOVASCULAR: Heart rate and rhythm were normal.  S1 and S2 were normal and there were no extra sounds or murmurs. Peripheral pulses in arms and legs were normal.  Jugular venous pressure was normal.  There was no peripheral edema.  GASTROINTESTINAL: The abdomen was normal in contour.  Bowel sounds were present.   Palpation detected no tenderness, mass, or enlarged organs.   MUSCULOSKELETAL: Skeletal configuration was normal and muscle mass was normal for age. Joint appearance was overall normal.  LYMPHATIC: There were no enlarged nodes.  SKIN/HAIR/NAILS: Skin color was normal.  There were no abnormal skin lesions.  Hair and nails were normal.  NEUROLOGIC: The patient was alert and oriented to person, place, time, and circumstance. Speech was normal. Cranial nerves were normal. Motor strength was normal for age. The patient was normally coordinated.  PSYCHIATRIC:  Mood and affect were normal and the patient had normal recent and remote memory. The patient's judgment and insight were normal.    EKG normal sinus rhythm with no abnormalities  Results for orders placed or performed in visit on 12/29/17   Electrocardiogram Perform and Read   Result Value Ref Range    SYSTOLIC BLOOD PRESSURE  mmHg    DIASTOLIC BLOOD PRESSURE  mmHg    VENTRICULAR RATE 83 BPM    ATRIAL RATE 83 BPM    P-R INTERVAL 166 ms    QRS DURATION 98 ms    Q-T INTERVAL 374 ms    QTC CALCULATION (BEZET) 439 ms    P Axis 48 degrees    R AXIS 40 degrees    T AXIS 25 degrees    MUSE DIAGNOSIS       Normal sinus rhythm  Normal ECG  No previous ECGs available     Radha Rodriguez MD  Internal Medicine  [facility]  Contact me at [phone]

## 2021-06-15 NOTE — ANESTHESIA CARE TRANSFER NOTE
Last vitals:   Vitals:    01/09/18 0920   BP:    Pulse: 99   Resp: 16   Temp: 36.4  C (97.6  F)   SpO2: 100%     Patient's level of consciousness is drowsy  Spontaneous respirations: yes  Maintains airway independently: yes  Dentition unchanged: yes  Oropharynx: oropharynx clear of all foreign objects    QCDR Measures:  ASA# 20 - Surgical Safety Checklist: WHO surgical safety checklist completed prior to induction  PQRS# 430 - Adult PONV Prevention: 4558F - Pt received => 2 anti-emetic agents (different classes) preop & intraop  ASA# 8 - Peds PONV Prevention: NA - Not pediatric patient, not GA or 2 or more risk factors NOT present  PQRS# 424 - Kindra-op Temp Management: 4559F - At least one body temp DOCUMENTED => 35.5C or 95.9F within required timeframe  PQRS# 426 - PACU Transfer Protocol: - Transfer of care checklist used  ASA# 14 - Acute Post-op Pain: ASA14B - Patient did NOT experience pain >= 7 out of 10

## 2021-06-15 NOTE — ANESTHESIA PREPROCEDURE EVALUATION
Anesthesia Evaluation      Patient summary reviewed   No history of anesthetic complications     Airway   Mallampati: II  Neck ROM: full   Pulmonary - negative ROS and normal exam    breath sounds clear to auscultation                         Cardiovascular - negative ROS and normal exam  Exercise tolerance: > or = 4 METS  (+) hypertension well controlled, , hypercholesterolemia,     Rhythm: regular  Rate: normal,         Neuro/Psych - negative ROS     Endo/Other - negative ROS      GI/Hepatic/Renal - negative ROS   (+) GERD well controlled,             Dental - normal exam                        Anesthesia Plan  Planned anesthetic: general endotracheal and peripheral nerve block  ISB and Sup. Cervical plexus blocks for POA as requested by Dr. Cano  ASA 2   Induction: intravenous   Anesthetic plan and risks discussed with: patient and spouse    Post-op plan: routine recovery

## 2021-06-16 PROBLEM — Z96.612 STATUS POST TOTAL REPLACEMENT OF LEFT SHOULDER: Status: ACTIVE | Noted: 2018-01-09

## 2021-06-18 NOTE — LETTER
Letter by Radha Rodriguez MD at      Author: Radha Rodriguez MD Service: -- Author Type: --    Filed:  Encounter Date: 3/13/2019 Status: (Other)       Cathy Beckwith  [address]  [place] [address]             March 13, 2019         Dear Ms. Beckwith,    Below are the results from your recent visit:    Resulted Orders   HM2(CBC w/o Differential)   Result Value Ref Range    WBC 5.7 4.0 - 11.0 thou/uL    RBC 3.96 3.80 - 5.40 mill/uL    Hemoglobin 13.2 12.0 - 16.0 g/dL    Hematocrit 38.7 35.0 - 47.0 %    MCV 98 80 - 100 fL    MCH 33.3 27.0 - 34.0 pg    MCHC 34.1 32.0 - 36.0 g/dL    RDW 11.1 11.0 - 14.5 %    Platelets 322 140 - 440 thou/uL    MPV 6.7 (L) 7.0 - 10.0 fL   Comprehensive Metabolic Panel   Result Value Ref Range    Sodium 128 (L) 136 - 145 mmol/L    Potassium 4.6 3.5 - 5.0 mmol/L    Chloride 92 (L) 98 - 107 mmol/L    CO2 26 22 - 31 mmol/L    Anion Gap, Calculation 10 5 - 18 mmol/L    Glucose 75 70 - 125 mg/dL    BUN 11 8 - 28 mg/dL    Creatinine 0.72 0.60 - 1.10 mg/dL    GFR MDRD Af Amer >60 >60 mL/min/1.73m2    GFR MDRD Non Af Amer >60 >60 mL/min/1.73m2    Bilirubin, Total 0.6 0.0 - 1.0 mg/dL    Calcium 10.2 8.5 - 10.5 mg/dL    Protein, Total 7.2 6.0 - 8.0 g/dL    Albumin 4.4 3.5 - 5.0 g/dL    Alkaline Phosphatase 76 45 - 120 U/L    AST 34 0 - 40 U/L    ALT 31 0 - 45 U/L    Narrative    Fasting Glucose reference range is 70-99 mg/dL per  American Diabetes Association (ADA) guidelines.   Thyroid Cascade   Result Value Ref Range    TSH 0.96 0.30 - 5.00 uIU/mL   Lipid Cascade FASTING   Result Value Ref Range    Cholesterol 165 <=199 mg/dL    Triglycerides 63 <=149 mg/dL    HDL Cholesterol 77 >=50 mg/dL    LDL Calculated 75 <=129 mg/dL    Patient Fasting > 8hrs? Yes        Cathy, the salt levels in your blood are too low.  This may be secondary to the combination of the losartan and hydrochlorothiazide.  This can often be a side effect.  Also, if people drink too much free water this can dilute the  sodium as well.  My plan for you would be to cut the hydrochlorothiazide dose in half.  Drink only when thirsty.  I need to see you back in about 6 weeks to recheck your blood pressure and recheck your salt levels at that time.  Hopefully things will correct.    On a good note, your other labs look outstanding!    Please call with questions or contact us using [facility].    Sincerely,        Electronically signed by Radha Rodriguez MD

## 2021-06-19 NOTE — LETTER
Letter by Radha Rodriguez MD at      Author: Radha Rodriguez MD Service: -- Author Type: --    Filed:  Encounter Date: 3/20/2019 Status: (Other)         Cathy Beckwith  [address] [place] [address]             March 20, 2019         Dear Ms. Beckwith,    Below are the results from your recent visit:    Resulted Orders   US Carotid Bilateral    Narrative    EXAM: US CAROTID BILATERAL  LOCATION: [facility]  DATE/TIME: 3/20/2019 11:59 AM    INDICATION: Occlusion and stenosis of bilateral carotid arteries  COMPARISON: 12/29/2017  TECHNIQUE: Duplex exam performed utilizing 2D gray-scale imaging, Doppler interrogation with color-flow and spectral waveform analysis.    FINDINGS:  RIGHT: There is moderate atheromatous plaque.     LEFT: There is moderate atheromatous plaque.     Both vertebral arteries and subclavian artery waveforms are normal.    VELOCITY CHART:  The following velocities were obtained in the RIGHT carotid system.  CCA: 98/32 cm/s  ICA: 137/37 cm/s  ECA: 103 cm/s  ICA/CCA: PS 0.8    The following velocities were obtained in the LEFT carotid system.  CCA: 97/30 cm/s  ICA: 126/45 cm/s  ECA: 96 cm/s  ICA/CCA: PS 1.2      Impression    CONCLUSION:  1. Moderate atheromatous plaque in the carotid arteries.  2. Peak systolic velocity compatible with 50-69% stenosis within the right internal carotid artery.    Evaluation based on velocities and NASCET criteria.       Cathy, you have moderate plaque in both your carotid arteries.  There is some narrowing in the right that is typically considered moderate.  The treatment for this is a baby aspirin daily and aggressive cholesterol reduction.  Your latest LDL cholesterol was under 100.  Our goal will be to keep it in the 70s-where it is currently!  We should recheck this in 1 year.    Please call with questions or contact us using [facility].    Sincerely,        Electronically signed by Radha Rodriguez MD

## 2021-06-24 NOTE — TELEPHONE ENCOUNTER
Spoke with pt and relayed PCP message.  Pt understanding and agreeable.  Pt wondering if pcp would recommend cutting back on her atorvastatin dose d/t recent lipid results.

## 2021-06-24 NOTE — TELEPHONE ENCOUNTER
DATE OF VISIT:  10/10/2017    SUBJECTIVE:  The patient was seen in his room sleeping, but awakens to verbal stimuli.  No new complaints have been reported.  The patient is tolerating tube feeds via PEG tube without any difficulty.    CURRENT MEDICATIONS:  Include:  1. Fluconazole.  2. Cefadroxil.    OBJECTIVE:    GENERAL:  No acute distress.  Currently comfortable, sleeping, but awakens to verbal stimuli.     VITAL SIGNS:  Temperature is 98, pulse is 83, respirations are 18, blood pressure is 159/88, oxygen saturation is 95% on 3 L per nasal cannula.     HEART:  S1 and S2 are audible.     LUNGS:  Clear to auscultation.       ABDOMEN:  Soft.  G-tube site remains unchanged.  There is lessening erythema.  There is no drainage.     EXTREMITIES:  No edema clubbing, or cyanosis.     SKIN:  No rashes.    LABORATORY DATA:  White blood cell count is 4.6, hemoglobin is 10.7, platelet count is 51, BUN is 74, creatinine is 5.72.    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  The patient's G-tube insertion site has greatly improved.  The patient is without any signs of systemic toxicity.  We will continue with current antibiotic therapy.    RECOMMENDATIONS:    1. Continue fluconazole 100 mg p.o. b.i.d. daily to complete a 10-day course.  2. Continue cefadroxil 500 mg daily to complete a 10-day course.  3. Topical zinc oxide b.i.d. to G-tube insertion site.  4. Infectious Disease to follow and give further recommendations as necessary.  5. The above was discussed with the patient and family at bedside.  6. The above discussed with Dr. Moraes.        __________________________  Carmen Downs NP  [id number]      D:  10/10/2017 10:56:00  T:  10/10/2017 12:12:57   CHATA/gurjit   Job:  [id number]          LMTCB Please relay PCP message to pt.

## 2021-06-24 NOTE — TELEPHONE ENCOUNTER
RN cannot approve Refill Request    RN can NOT refill this medication PCP messaged that patient is overdue for Office Visit.      Jacey Kwan, [facility] Triage/Med Refill 2/20/2019    Requested Prescriptions   Pending Prescriptions Disp Refills     atorvastatin (LIPITOR) 80 MG tablet [Pharmacy Med Name: ATORVASTATIN 80 MG TABLET] 90 tablet 3     Sig: TAKE 1 TABLET BY MOUTH AT BEDTIME    Statins Refill Protocol (Hmg CoA Reductase Inhibitors) Failed - 2/18/2019  9:25 AM       Failed - PCP or prescribing provider visit in past 12 months     Last office visit with prescriber/PCP: Visit date not found OR same dept: Visit date not found OR same specialty: Visit date not found  Last physical: 12/29/2017 Last MTM visit: Visit date not found   Next visit within 3 mo: Visit date not found  Next physical within 3 mo: Visit date not found  Prescriber OR PCP: Radha Rodriguez MD  Last diagnosis associated with med order: 1. Pure hypercholesterolemia  - atorvastatin (LIPITOR) 80 MG tablet [Pharmacy Med Name: ATORVASTATIN 80 MG TABLET]; TAKE 1 TABLET BY MOUTH AT BEDTIME  Dispense: 90 tablet; Refill: 3    If protocol passes may refill for 12 months if within 3 months of last provider visit (or a total of 15 months).

## 2021-06-24 NOTE — TELEPHONE ENCOUNTER
Cathy, your sodium levels are too low.  This is likely a side effect of your losartan and hydrochlorothiazide medications.  Can you cut your hydrochlorothiazide dose in half.  Let us have you return in 6-8 weeks for a recheck of both the lab and blood pressure as well as the situation with your sinuses.  Hopefully this will correct.

## 2021-06-24 NOTE — PROGRESS NOTES
Assessment and Plan:   Annual wellness forms reviewed.  No specific needs identified    1. Preventative health care  Up-to-date on ophthalmologic, dental and Derm care.  Mammogram scheduled.  Colon cancer screening due.  We are going to proceed with Yadira guard.  Bone density is up-to-date.  Immunizations reviewed.  She is due for a tetanus shot.  Have discussed the importance of the shingles X vaccine.  She is retired now and is exercising  - HM2(CBC w/o Differential)  - Comprehensive Metabolic Panel  - Lipid [place] FASTING  - Cologuard    2. Hypercholesterolemia  We will update labs  - Thyroid [place]  - Lipid [place] FASTING    3. Hypertension  Continue on current medication    4. Medication monitoring encounter  Update labs  - HM2(CBC w/o Differential)  - Comprehensive Metabolic Panel    5. Screen for colon cancer  She will proceed with COL0-guard    6. Anosmia/congestion right sinus since January  With inflammation of the right tympanic membrane.  Recommendations: Flonase at bedtime, saline nasal rinses during the day.  Medrol Dosepak.  Referral to ENT  - Ambulatory referral to ENT    7. Carotid atherosclerosis, bilateral  We will update ultrasound  - US Carotid Bilateral; Future    8. Bunion, right  With increased pain-status post previous surgery  - Ambulatory referral to Orthopedics     The patient's current medical problems were reviewed.      The following health maintenance schedule was reviewed with the patient and provided in printed form in the after visit summary:   Health Maintenance   Topic Date Due     ADVANCE DIRECTIVES DISCUSSED WITH PATIENT  06/17/1963     ZOSTER VACCINES (2 of 3) 02/14/2014     COLONOSCOPY  01/02/2018     INFLUENZA VACCINE RULE BASED (1) 08/01/2018     MAMMOGRAM  04/17/2019     TD 18+ HE  09/29/2019     DXA SCAN  04/17/2019     FALL RISK ASSESSMENT  03/12/2020     PNEUMOCOCCAL POLYSACCHARIDE VACCINE AGE 65 AND OVER  Completed     PNEUMOCOCCAL CONJUGATE VACCINE FOR ADULTS  (PCV13 OR PREVNAR)  Completed        Subjective:   Chief Complaint: Cathy Beckwith is an 73 y.o. female here for an Annual Wellness visit.   HPI: Cathy is a delightful 73-year-old female who is here today for a wellness visit.  In general she enjoys good health.  She has retired.  She has had her shoulder replaced and is delighted with the results.  She states that she has been able to hike and do a variety of physical activities.  She states overall, also, her appetite has been decreased.  She is so happy about 25 pounds of weight that she has lost since her shelter.  She states overall she feels well.    Her only concern today is that with regard to anosmia.  She states she has lost smell and taste with inflammation of the right side of her sinuses.  She does have a remote history of trauma to the right side of the face.  Since that time, she has had intermittent episodes of right sided sinus congestion.  Since January, she has been congested on the right.  She feels pressure in the ear.  She has tried nasal rinses, Coricidin, etc.    Health maintenance is reviewed and updated.    Review of Systems:    Please see above.  The rest of the review of systems are negative for all systems.    Patient Care Team:  Radha Rodriguez MD as PCP - General     Patient Active Problem List   Diagnosis     Lower Back Pain Chronic     Hypercholesterolemia     Hypertension     Allergic Rhinitis     Status post total replacement of left shoulder     Past Medical History:   Diagnosis Date     Allergic rhinitis      Hyperlipidemia      Hypertension       Past Surgical History:   Procedure Laterality Date     BUNIONECTOMY       HI LIGATE FALLOPIAN TUBE      Description: Tubal Ligation;  Recorded: 09/29/2009;     HI RECONSTR TOTAL SHOULDER IMPLANT Left 1/9/2018    Procedure: LEFT TOTAL SHOULDER ARTHROPLASTY;  Surgeon: Freddie Flores MD;  Location: [facility];  Service: Orthopedics      Family History   Problem Relation Age  of Onset     Heart disease Mother      Hyperlipidemia Mother      Hypertension Father      COPD Father      Stroke Father      Heart disease Sister      Anorexia nervosa Sister       Social History     Socioeconomic History     Marital status:      Spouse name: Not on file     Number of children: Not on file     Years of education: Not on file     Highest education level: Not on file   Occupational History     Not on file   Social Needs     Financial resource strain: Not on file     Food insecurity:     Worry: Not on file     Inability: Not on file     Transportation needs:     Medical: Not on file     Non-medical: Not on file   Tobacco Use     Smoking status: Never Smoker     Smokeless tobacco: Never Used   Substance and Sexual Activity     Alcohol use: Yes     Comment: 4 glasses a week     Drug use: No     Sexual activity: Not on file   Lifestyle     Physical activity:     Days per week: Not on file     Minutes per session: Not on file     Stress: Not on file   Relationships     Social connections:     Talks on phone: Not on file     Gets together: Not on file     Attends Restoration service: Not on file     Active member of club or organization: Not on file     Attends meetings of clubs or organizations: Not on file     Relationship status: Not on file     Intimate partner violence:     Fear of current or ex partner: Not on file     Emotionally abused: Not on file     Physically abused: Not on file     Forced sexual activity: Not on file   Other Topics Concern     Not on file   Social History Narrative     Not on file      Current Outpatient Medications   Medication Sig Dispense Refill     acetaminophen (TYLENOL) 500 MG tablet Take 1 tablet (500 mg total) by mouth every 4 (four) hours as needed.  0     aspirin 325 MG EC tablet Take 1 tablet (325 mg total) by mouth 2 (two) times a day with meals.  0     atorvastatin (LIPITOR) 80 MG tablet TAKE 1 TABLET BY MOUTH AT BEDTIME 90 tablet 3     cholecalciferol,  "vitamin D3, 1,000 unit tablet Take 2,000 Units by mouth every evening.       docusate sodium (COLACE) 100 MG capsule Take 1 capsule (100 mg total) by mouth 2 (two) times a day as needed for constipation.  0     fluticasone (FLONASE) 50 mcg/actuation nasal spray Apply 2 sprays into each nostril daily as needed for rhinitis.       hydroCHLOROthiazide (HYDRODIURIL) 25 MG tablet Take 25 mg by mouth every evening.       ibuprofen (ADVIL,MOTRIN) 400 MG tablet Take 1 tablet (400 mg total) by mouth every 6 (six) hours as needed for pain or mild pain (1-3).  0     losartan (COZAAR) 25 MG tablet Take 1 tablet (25 mg total) by mouth daily. 90 tablet 2     valACYclovir (VALTREX) 1000 MG tablet Take 500 mg by mouth every evening.       valACYclovir (VALTREX) 1000 MG tablet TAKE 1 TABLET (1,000 MG TOTAL) BY MOUTH DAILY. 90 tablet 2     methylPREDNISolone (MEDROL DOSEPACK) 4 mg tablet follow package directions 21 tablet 0     No current facility-administered medications for this visit.       Objective:   Vital Signs:   Visit Vitals  /84   Pulse 84   Ht 5' 4.5\" (1.638 m)   Wt 150 lb 5 oz (68.2 kg)   SpO2 99%   BMI 25.40 kg/m         VisionScreening:  No exam data present     PHYSICAL EXAM  EYES: Eyelids, conjunctiva, and sclera were normal. Pupils were normal. Cornea, iris, and lens were normal bilaterally.  HEAD, EARS, NOSE, MOUTH, AND THROAT: Head and face were normal. Hearing was normal to voice and the ears were normal to external exam. Nose appearance was normal and there was no discharge. Oropharynx was normal.  TMs were normal.  NECK: Neck appearance was normal. There were no neck masses and the thyroid was not enlarged.  RESPIRATORY: Breathing pattern was normal and the chest moved symmetrically.   Lung sounds were equal bilaterally.  CARDIOVASCULAR: Heart rate and rhythm were normal.  S1 and S2 were normal and there were no extra sounds or murmurs. Peripheral pulses in arms and legs were normal.  Jugular venous " pressure was normal.  There was no peripheral edema.  GASTROINTESTINAL: The abdomen was normal in contour.  Bowel sounds were present.   Palpation detected no tenderness, mass, or enlarged organs.   MUSCULOSKELETAL: Skeletal configuration was normal and muscle mass was normal for age. Joint appearance was overall normal.  LYMPHATIC: There were no enlarged nodes.  SKIN/HAIR/NAILS: Skin color was normal.  There were no abnormal skin lesions.  Hair and nails were normal.  NEUROLOGIC: The patient was alert and oriented to person, place, time, and circumstance. Speech was normal. Cranial nerves were normal. Motor strength was normal for age. The patient was normally coordinated.  PSYCHIATRIC:  Mood and affect were normal and the patient had normal recent and remote memory. The patient's judgment and insight were normal.  BREASTS: Examined bilaterally and within normal limits        Assessment Results 3/12/2019   Activities of Daily Living No help needed   Instrumental Activities of Daily Living No help needed   Mini Cog Total Score 4   Some recent data might be hidden     A Mini-Cog score of 0-2 suggests the possibility of dementia, score of 3-5 suggests no dementia    Identified Health Risks:     The patient reports that she drinks more than one alcoholic drink per day but denies binge or excessive drinking. She was counseled and given information about possible harmful effects of excessive alcohol intake.  Patient's advanced directive was discussed and I am comfortable with the patient's wishes.

## 2021-06-27 ENCOUNTER — HEALTH MAINTENANCE LETTER (OUTPATIENT)
Age: 76
End: 2021-06-27

## 2021-07-03 NOTE — ADDENDUM NOTE
Addendum Note by Alice Rodriguez MD at 12/29/2017  3:44 PM     Author: Alice Rodriguez MD Service: -- Author Type: Physician    Filed: 12/29/2017  3:44 PM Encounter Date: 12/29/2017 Status: Signed    : Alice Rodriguez MD (Physician)    Addended by: ALICE RODRIGUEZ on: 12/29/2017 03:44 PM        Modules accepted: Orders

## 2021-07-13 ENCOUNTER — RECORDS - HEALTHEAST (OUTPATIENT)
Dept: ADMINISTRATIVE | Facility: CLINIC | Age: 76
End: 2021-07-13

## 2021-07-21 ENCOUNTER — RECORDS - HEALTHEAST (OUTPATIENT)
Dept: ADMINISTRATIVE | Facility: CLINIC | Age: 76
End: 2021-07-21

## 2021-07-22 DIAGNOSIS — E78.00 PURE HYPERCHOLESTEROLEMIA: ICD-10-CM

## 2021-07-22 DIAGNOSIS — I10 ESSENTIAL HYPERTENSION: ICD-10-CM

## 2021-07-22 RX ORDER — ATORVASTATIN CALCIUM 80 MG/1
TABLET, FILM COATED ORAL
Qty: 90 TABLET | Refills: 2 | Status: SHIPPED | OUTPATIENT
Start: 2021-07-22 | End: 2022-05-04

## 2021-07-22 RX ORDER — HYDROCHLOROTHIAZIDE 25 MG/1
TABLET ORAL
Qty: 90 TABLET | Refills: 3 | Status: SHIPPED | OUTPATIENT
Start: 2021-07-22 | End: 2021-10-15

## 2021-07-22 RX ORDER — LOSARTAN POTASSIUM 25 MG/1
TABLET ORAL
Qty: 90 TABLET | Refills: 3 | Status: SHIPPED | OUTPATIENT
Start: 2021-07-22 | End: 2021-10-15

## 2021-07-22 NOTE — TELEPHONE ENCOUNTER
Reason for Call:  Medication or medication refill:    Do you use a [facility]?  Name of the pharmacy and phone number for the current request:  [facility] on [address] in [facility]    Name of the medication requested:   losartan (COZAAR) 25 MG tablet 90 tablet 3 7/30/2020  No   Sig: [LOSARTAN (COZAAR) 25 MG TABLET] TAKE 1 TABLET BY MOUTH EVERY DAY     atorvastatin (LIPITOR) 80 MG tablet 90 tablet 2 11/2/2020  No   Sig: [ATORVASTATIN (LIPITOR) 80 MG TABLET] TAKE 1 TABLET BY MOUTH EVERYDAY AT BEDTIME     hydroCHLOROthiazide (HYDRODIURIL) 25 MG tablet 90 tablet 3 8/25/2020  No   Sig: [HYDROCHLOROTHIAZIDE (HYDRODIURIL) 25 MG TABLET] TAKE 1 TABLET BY MOUTH EVERY DAY         Other request: pt is having some family issues and needs to go to [place] for 2-3 weeks for sick grandchild.,  Hoping to get 30 day refill and then will have appt once back.    Pt did schedule appt for August 20    Can we leave a detailed message on this number? YES    Phone number patient can be reached at: Cell number on file:    Telephone Information:   Mobile [phone]       Best Time: any    Call taken on 7/22/2021 at 10:25 AM by Pam J. Behr

## 2021-10-15 ENCOUNTER — OFFICE VISIT (OUTPATIENT)
Dept: INTERNAL MEDICINE | Facility: CLINIC | Age: 76
End: 2021-10-15
Payer: COMMERCIAL

## 2021-10-15 VITALS
HEIGHT: 65 IN | BODY MASS INDEX: 22.16 KG/M2 | OXYGEN SATURATION: 99 % | WEIGHT: 133 LBS | HEART RATE: 86 BPM | DIASTOLIC BLOOD PRESSURE: 82 MMHG | SYSTOLIC BLOOD PRESSURE: 142 MMHG

## 2021-10-15 DIAGNOSIS — Z23 HIGH PRIORITY FOR 2019-NCOV VACCINE: ICD-10-CM

## 2021-10-15 DIAGNOSIS — Z12.31 ENCOUNTER FOR SCREENING MAMMOGRAM FOR MALIGNANT NEOPLASM OF BREAST: ICD-10-CM

## 2021-10-15 DIAGNOSIS — E87.1 HYPONATREMIA: ICD-10-CM

## 2021-10-15 DIAGNOSIS — E78.00 HYPERCHOLESTEROLEMIA: ICD-10-CM

## 2021-10-15 DIAGNOSIS — I10 ESSENTIAL HYPERTENSION: Primary | ICD-10-CM

## 2021-10-15 DIAGNOSIS — E55.9 VITAMIN D DEFICIENCY: ICD-10-CM

## 2021-10-15 DIAGNOSIS — Z00.00 ENCOUNTER FOR PREVENTIVE CARE: ICD-10-CM

## 2021-10-15 LAB
ALBUMIN SERPL-MCNC: 4.1 G/DL (ref 3.5–5)
ALP SERPL-CCNC: 83 U/L (ref 45–120)
ALT SERPL W P-5'-P-CCNC: 22 U/L (ref 0–45)
ANION GAP SERPL CALCULATED.3IONS-SCNC: 11 MMOL/L (ref 5–18)
AST SERPL W P-5'-P-CCNC: 32 U/L (ref 0–40)
BASOPHILS # BLD AUTO: 0 10E3/UL (ref 0–0.2)
BASOPHILS NFR BLD AUTO: 1 %
BILIRUB SERPL-MCNC: 0.8 MG/DL (ref 0–1)
BUN SERPL-MCNC: 11 MG/DL (ref 8–28)
CALCIUM SERPL-MCNC: 10.2 MG/DL (ref 8.5–10.5)
CHLORIDE BLD-SCNC: 94 MMOL/L (ref 98–107)
CO2 SERPL-SCNC: 25 MMOL/L (ref 22–31)
CREAT SERPL-MCNC: 0.71 MG/DL (ref 0.6–1.1)
EOSINOPHIL # BLD AUTO: 0.2 10E3/UL (ref 0–0.7)
EOSINOPHIL NFR BLD AUTO: 4 %
ERYTHROCYTE [DISTWIDTH] IN BLOOD BY AUTOMATED COUNT: 13.5 % (ref 10–15)
GFR SERPL CREATININE-BSD FRML MDRD: 84 ML/MIN/1.73M2
GLUCOSE BLD-MCNC: 92 MG/DL (ref 70–125)
HCT VFR BLD AUTO: 37.9 % (ref 35–47)
HGB BLD-MCNC: 12.4 G/DL (ref 11.7–15.7)
IMM GRANULOCYTES # BLD: 0 10E3/UL
IMM GRANULOCYTES NFR BLD: 0 %
LYMPHOCYTES # BLD AUTO: 1.3 10E3/UL (ref 0.8–5.3)
LYMPHOCYTES NFR BLD AUTO: 30 %
MCH RBC QN AUTO: 32.6 PG (ref 26.5–33)
MCHC RBC AUTO-ENTMCNC: 32.7 G/DL (ref 31.5–36.5)
MCV RBC AUTO: 100 FL (ref 78–100)
MONOCYTES # BLD AUTO: 0.7 10E3/UL (ref 0–1.3)
MONOCYTES NFR BLD AUTO: 15 %
NEUTROPHILS # BLD AUTO: 2.2 10E3/UL (ref 1.6–8.3)
NEUTROPHILS NFR BLD AUTO: 50 %
PLATELET # BLD AUTO: 293 10E3/UL (ref 150–450)
POTASSIUM BLD-SCNC: 4.7 MMOL/L (ref 3.5–5)
PROT SERPL-MCNC: 6.9 G/DL (ref 6–8)
RBC # BLD AUTO: 3.8 10E6/UL (ref 3.8–5.2)
SODIUM SERPL-SCNC: 130 MMOL/L (ref 136–145)
WBC # BLD AUTO: 4.4 10E3/UL (ref 4–11)

## 2021-10-15 PROCEDURE — 91300 COVID-19,PF,PFIZER (12+ YRS): CPT | Performed by: INTERNAL MEDICINE

## 2021-10-15 PROCEDURE — 36415 COLL VENOUS BLD VENIPUNCTURE: CPT | Performed by: INTERNAL MEDICINE

## 2021-10-15 PROCEDURE — 0004A COVID-19,PF,PFIZER (12+ YRS): CPT | Performed by: INTERNAL MEDICINE

## 2021-10-15 PROCEDURE — 85025 COMPLETE CBC W/AUTO DIFF WBC: CPT | Performed by: INTERNAL MEDICINE

## 2021-10-15 PROCEDURE — 99214 OFFICE O/P EST MOD 30 MIN: CPT | Mod: 25 | Performed by: INTERNAL MEDICINE

## 2021-10-15 PROCEDURE — G0008 ADMIN INFLUENZA VIRUS VAC: HCPCS | Performed by: INTERNAL MEDICINE

## 2021-10-15 PROCEDURE — 86803 HEPATITIS C AB TEST: CPT | Performed by: INTERNAL MEDICINE

## 2021-10-15 PROCEDURE — 90662 IIV NO PRSV INCREASED AG IM: CPT | Performed by: INTERNAL MEDICINE

## 2021-10-15 PROCEDURE — 82306 VITAMIN D 25 HYDROXY: CPT | Performed by: INTERNAL MEDICINE

## 2021-10-15 PROCEDURE — 80053 COMPREHEN METABOLIC PANEL: CPT | Performed by: INTERNAL MEDICINE

## 2021-10-15 RX ORDER — LOSARTAN POTASSIUM 25 MG/1
TABLET ORAL
Qty: 90 TABLET | Refills: 3 | Status: CANCELLED | OUTPATIENT
Start: 2021-10-15

## 2021-10-15 RX ORDER — LOSARTAN POTASSIUM AND HYDROCHLOROTHIAZIDE 12.5; 5 MG/1; MG/1
1 TABLET ORAL DAILY
Qty: 90 TABLET | Refills: 1 | Status: SHIPPED | OUTPATIENT
Start: 2021-10-15 | End: 2022-04-06

## 2021-10-15 ASSESSMENT — MIFFLIN-ST. JEOR: SCORE: 1091.22

## 2021-10-15 NOTE — PROGRESS NOTES
"[facility] Follow Up Note    Assessment/Plan:  1. Essential hypertension  With suboptimal control-lots of increased stress.  We will check general chemistries as she has had low sodiums in the past.  We will switch losartan to combination tablet at slightly higher dose.  Follow-up 2 to 3 months  - losartan-hydrochlorothiazide (HYZAAR) 50-12.5 MG tablet; Take 1 tablet by mouth daily  Dispense: 90 tablet; Refill: 1  - CBC with Platelets & Differential; Future  - Comprehensive metabolic panel; Future    2. Hypercholesterolemia  We will update lab at next visit    3. Encounter for preventive care  Ordered  - MA Screen Bilateral w/Doc; Future  - Hepatitis C antibody; Future    4. Encounter for screening mammogram for malignant neoplasm of breast   Ordered  - MA Screen Bilateral w/Doc; Future    5. Vitamin D deficiency  Lab  - Vitamin D Deficiency; Future    6. High priority for 2019-nCoV vaccine  Booster today  - COVID-19,PF,PFIZER        Radha Rodriguez MD, MD    Chief Complaint:  Chief Complaint   Patient presents with     RECHECK     meds     Imm/Inj     COVID-19 VACCINE       History of Present Illness:  Cathy is a 76 year old female who is here today for a medication check.  I have not seen her in greater than 2 years.  Her medical problems are hyper lipidemia, hypertension.  She states she has been very busy during the pandemic caring for a sister who has advanced medical problems and is worried about her grandchildren as well.    She denies chest pain or shortness of breath.  She states her blood pressure has been running on the \"high side \".  Of health maintenance is reviewed.  She has not had a mammogram in a couple of years due to the pandemic    Review of Systems:  A comprehensive review of systems was performed and was otherwise negative    PFSH:  Social History: .  History   Smoking Status     Never Smoker   Smokeless Tobacco     Never Used       Past History:   Past Medical History: " "  Diagnosis Date     Allergic rhinitis      Hyperlipidemia      Hypertension        Current Outpatient Medications   Medication Sig Dispense Refill     aspirin 81 MG EC tablet [ASPIRIN 81 MG EC TABLET] Take 81 mg by mouth daily.       atorvastatin (LIPITOR) 80 MG tablet [ATORVASTATIN (LIPITOR) 80 MG TABLET] TAKE 1 TABLET BY MOUTH EVERYDAY AT BEDTIME 90 tablet 2     cholecalciferol, vitamin D3, 1,000 unit tablet [CHOLECALCIFEROL, VITAMIN D3, 1,000 UNIT TABLET] Take 2,000 Units by mouth every evening.       docusate sodium (COLACE) 100 MG capsule [DOCUSATE SODIUM (COLACE) 100 MG CAPSULE] Take 1 capsule (100 mg total) by mouth 2 (two) times a day as needed for constipation.  0     hydrocortisone (ANUSOL-HC) 2.5 % rectal cream [HYDROCORTISONE (ANUSOL-HC) 2.5 % RECTAL CREAM] USE AS DIRECTED DAILY AS NEEDED 30 g 3     losartan-hydrochlorothiazide (HYZAAR) 50-12.5 MG tablet Take 1 tablet by mouth daily 90 tablet 1     naproxen sodium (ALEVE) 220 MG tablet [NAPROXEN SODIUM (ALEVE) 220 MG TABLET] Take 220 mg by mouth 2 (two) times a day with meals.       triamcinolone acetonide (NASACORT AQ NASL) [TRIAMCINOLONE ACETONIDE (NASACORT AQ NASL)] into each nostril.       valACYclovir (VALTREX) 1000 MG tablet [VALACYCLOVIR (VALTREX) 1000 MG TABLET] TAKE 1 TABLET BY MOUTH EVERY DAY 90 tablet 3       Family History:     Physical Exam:  General Appearance:   Appears well and in no acute distress  Vitals:    10/15/21 1227 10/15/21 1232   BP: (!) 146/88 (!) 142/82   BP Location: Left arm    Patient Position: Sitting    Cuff Size: Adult Regular    Pulse: 86    SpO2: 99%    Weight: 60.3 kg (133 lb)    Height: 1.638 m (5' 4.5\")      Wt Readings from Last 3 Encounters:   10/15/21 60.3 kg (133 lb)   07/31/20 61.2 kg (135 lb)   07/11/19 65.3 kg (144 lb)     Body mass index is 22.48 kg/m .      " I will START or STAY ON the medications listed below when I get home from the hospital:    acetaminophen 650 mg oral tablet, extended release  -- 1 tab(s) by mouth every 8 hours   -- This product contains acetaminophen.  Do not use  with any other product containing acetaminophen to prevent possible liver damage.    -- Indication: For pain    ibuprofen 600 mg oral tablet  -- 1 tab(s) by mouth every 6 hours   -- Do not take this drug if you are pregnant.  It is very important that you take or use this exactly as directed.  Do not skip doses or discontinue unless directed by your doctor.  May cause drowsiness or dizziness.  Obtain medical advice before taking any non-prescription drugs as some may affect the action of this medication.  Take with food or milk.    -- Indication: For pain    oxyCODONE 5 mg oral tablet  -- 1 tab(s) by mouth every 8 hours MDD:3 tabs  -- Caution federal law prohibits the transfer of this drug to any person other  than the person for whom it was prescribed.  It is very important that you take or use this exactly as directed.  Do not skip doses or discontinue unless directed by your doctor.  May cause drowsiness or dizziness.  This prescription cannot be refilled.  Using more of this medication than prescribed may cause serious breathing problems.    -- Indication: For severe pain

## 2021-10-18 LAB
DEPRECATED CALCIDIOL+CALCIFEROL SERPL-MC: 68 UG/L (ref 30–80)
HCV AB SERPL QL IA: NEGATIVE

## 2021-10-20 DIAGNOSIS — B00.9 HERPES SIMPLEX: ICD-10-CM

## 2021-10-20 RX ORDER — VALACYCLOVIR HYDROCHLORIDE 1 G/1
TABLET, FILM COATED ORAL
Qty: 90 TABLET | Refills: 3 | Status: SHIPPED | OUTPATIENT
Start: 2021-10-20 | End: 2023-02-13

## 2021-10-20 NOTE — TELEPHONE ENCOUNTER
"Last Written Prescription Date:  7/30/20  Last Fill Quantity: 90,  # refills: 3   Last office visit provider:  10/15/21     Requested Prescriptions   Pending Prescriptions Disp Refills     valACYclovir (VALTREX) 1000 mg tablet [Pharmacy Med Name: VALACYCLOVIR HCL 1 GRAM TABLET] 90 tablet 3     Sig: TAKE 1 TABLET BY MOUTH EVERY DAY       Antivirals for Herpes Protocol Passed - 10/20/2021 12:05 AM        Passed - Patient is age 12 or older        Passed - Recent (12 mo) or future (30 days) visit within the authorizing provider's specialty     Patient has had an office visit with the authorizing provider or a provider within the authorizing providers department within the previous 12 mos or has a future within next 30 days. See \"Patient Info\" tab in inbasket, or \"Choose Columns\" in Meds & Orders section of the refill encounter.              Passed - Medication is active on med list        Passed - Normal serum creatinine on file in past 12 months     Recent Labs   Lab Test 10/15/21  1319   CR 0.71       Ok to refill medication if creatinine is low               Umu Cardoso RN 10/20/21 4:56 PM  "

## 2021-11-10 ENCOUNTER — ANCILLARY PROCEDURE (OUTPATIENT)
Dept: MAMMOGRAPHY | Facility: CLINIC | Age: 76
End: 2021-11-10
Attending: INTERNAL MEDICINE
Payer: COMMERCIAL

## 2021-11-10 DIAGNOSIS — Z12.31 ENCOUNTER FOR SCREENING MAMMOGRAM FOR MALIGNANT NEOPLASM OF BREAST: ICD-10-CM

## 2021-11-10 DIAGNOSIS — Z00.00 ENCOUNTER FOR PREVENTIVE CARE: ICD-10-CM

## 2021-11-10 PROCEDURE — 77063 BREAST TOMOSYNTHESIS BI: CPT | Mod: TC | Performed by: INTERNAL MEDICINE

## 2021-11-10 PROCEDURE — 77067 SCR MAMMO BI INCL CAD: CPT | Mod: TC | Performed by: INTERNAL MEDICINE

## 2022-04-04 ENCOUNTER — OFFICE VISIT (OUTPATIENT)
Dept: OTHER | Facility: CLINIC | Age: 77
End: 2022-04-04
Payer: COMMERCIAL

## 2022-04-04 VITALS
RESPIRATION RATE: 16 BRPM | SYSTOLIC BLOOD PRESSURE: 188 MMHG | TEMPERATURE: 97.1 F | OXYGEN SATURATION: 98 % | HEART RATE: 73 BPM | WEIGHT: 127 LBS | DIASTOLIC BLOOD PRESSURE: 100 MMHG | BODY MASS INDEX: 21.46 KG/M2

## 2022-04-04 DIAGNOSIS — Z00.00 ENCOUNTER FOR MEDICARE ANNUAL WELLNESS EXAM: Primary | ICD-10-CM

## 2022-04-04 PROCEDURE — G0438 PPPS, INITIAL VISIT: HCPCS | Performed by: FAMILY MEDICINE

## 2022-04-04 RX ORDER — CETIRIZINE HYDROCHLORIDE 10 MG/1
10 TABLET ORAL DAILY
COMMUNITY

## 2022-04-04 ASSESSMENT — ACTIVITIES OF DAILY LIVING (ADL): CURRENT_FUNCTION: NO ASSISTANCE NEEDED

## 2022-04-04 ASSESSMENT — PAIN SCALES - GENERAL: PAINLEVEL: NO PAIN (0)

## 2022-04-04 NOTE — PATIENT INSTRUCTIONS
Patient Education   Personalized Prevention Plan  You are due for the preventive services outlined below.  Your care team is available to assist you in scheduling these services.  If you have already completed any of these items, please share that information with your care team to update in your medical record.  Health Maintenance Due   Topic Date Due     ANNUAL REVIEW OF  ORDERS  Never done     Zoster (Shingles) Vaccine (3 of 3) 01/28/2022

## 2022-04-04 NOTE — PROGRESS NOTES
"Assessment & Plan     ICD-10-CM    1. Encounter for Medicare annual wellness exam  Z00.00        Follow Up/Next Steps    Return in about 53 weeks (around 4/10/2023) for Annual Wellness Visit.  Recommended that patient follow up with PCP to address the following : follow up with pcp to adress chronic health conditions. patient due  For third dose of  zoster immunization. Elevated blood pressure note, know hypertension. Patient states that she drank a cup of coffee prior to my arrival, and that she has been stressed out and anxious about the passing of her  and sister. pcp notified about her condition.     Counseling and Education  Reviewed preventive health counseling, as reflected in patient instructions        Appropriate preventive services were discussed with this patient, including applicable screening as appropriate for cardiovascular disease, diabetes, osteopenia/osteoporosis, and glaucoma.  As appropriate for age/gender, discussed screening for colorectal cancer, prostate cancer, breast cancer, and cervical cancer. Checklist reviewing preventive services available has been given to the patient.    Reviewed patients plan of care and provided an AVS. The Basic Care Plan (routine screening as documented in Health Maintenance) for Cathy meets the Care Plan requirement. This Care Plan has been established and reviewed with the Patient.    Visit Provider: Helena Romero RN  Supervising Provider: Brianna Pelaez MD, MD  [facility]       Subjective   Cathy is a 76 year old who is being seen for an Annual Wellness Visit     Healthy Habits:     In general, how would you rate your overall health?  Very good    Frequency of exercise:  1 day/week    Duration of exercise:  Less than 15 minutes    Do you usually eat at least 4 servings of fruit and vegetables a day, include whole grains    & fiber and avoid regularly eating high fat or \"junk\" foods?  Yes    Taking " medications regularly:  Yes    Barriers to taking medications:  None    Medication side effects:  None    Ability to successfully perform activities of daily living:  No assistance needed    Home Safety:  Throw rugs in the hallway    Hearing Impairment:  No hearing concerns    In the past 6 months, have you been bothered by leaking of urine?  No    In general, how would you rate your overall mental or emotional health?  Good      PHQ-2 Total Score: 1    Additional concerns today:  No    Do you feel safe in your environment? Yes    Have you ever done Advance Care Planning? (For example, a Health Directive, POLST, or a discussion with a medical provider or your loved ones about your wishes): No, advance care planning information given to patient to review.  Patient plans to discuss their wishes with loved ones or provider.      Fall risk  Fallen 2 or more times in the past year?: No  Any fall with injury in the past year?: No  Cognitive Screening   1) Repeat 3 items (Leader, Season, Table)    2) Clock draw: NORMAL  3) 3 item recall: Recalls 3 objects  Results: 3 items recalled: COGNITIVE IMPAIRMENT LESS LIKELY    Mini-CogTM Copyright S Axel. Licensed by the author for use in [facility]; reprinted with permission (armin@[email or web address]). All rights reserved.      Do you have sleep apnea, excessive snoring or daytime drowsiness?: no    Reviewed and updated as needed this visit by clinical staff   Tobacco  Allergies  Meds   Med Hx  Surg Hx  Fam Hx  Soc Hx        Social History     Tobacco Use     Smoking status: Never Smoker     Smokeless tobacco: Never Used   Substance Use Topics     Alcohol use: Yes     Comment: glass of wine daily        Current providers sharing in care for this patient include:   Patient Care Team:  Radha Rodriguez MD as PCP - General  Radha Rodriguez MD as Assigned PCP    The following health maintenance items are reviewed in Epic and correct as of today:  Health Maintenance Due  "  Topic Date Due     ANNUAL REVIEW OF  ORDERS  Never done     ZOSTER IMMUNIZATION (3 of 3) 01/28/2022       Mammogram Screening - Patient over age 75, has elected to continue with screening.  Pertinent mammograms are reviewed under the imaging tab.        Objective    BP (!) 188/100 (BP Location: Right arm, Patient Position: Sitting)   Pulse 73   Temp 97.1  F (36.2  C) (Temporal)   Resp 16   Wt 57.6 kg (127 lb)   SpO2 98%   BMI 21.46 kg/m   Estimated body mass index is 21.46 kg/m  as calculated from the following:    Height as of 10/15/21: 1.638 m (5' 4.5\").    Weight as of this encounter: 57.6 kg (127 lb).  Physical Exam  Patient appears comfortable and in no acute distress.        Identified Health Risks:  "

## 2022-04-05 DIAGNOSIS — I10 ESSENTIAL HYPERTENSION: ICD-10-CM

## 2022-04-06 ENCOUNTER — MYC MEDICAL ADVICE (OUTPATIENT)
Dept: INTERNAL MEDICINE | Facility: CLINIC | Age: 77
End: 2022-04-06
Payer: COMMERCIAL

## 2022-04-06 DIAGNOSIS — I10 ESSENTIAL HYPERTENSION: ICD-10-CM

## 2022-04-06 DIAGNOSIS — I10 ESSENTIAL HYPERTENSION: Primary | ICD-10-CM

## 2022-04-06 RX ORDER — AMLODIPINE BESYLATE 5 MG/1
5 TABLET ORAL DAILY
Qty: 60 TABLET | Refills: 1 | Status: SHIPPED | OUTPATIENT
Start: 2022-04-06 | End: 2022-06-02

## 2022-04-06 RX ORDER — LOSARTAN POTASSIUM AND HYDROCHLOROTHIAZIDE 12.5; 5 MG/1; MG/1
TABLET ORAL
Qty: 90 TABLET | Refills: 1 | Status: SHIPPED | OUTPATIENT
Start: 2022-04-06 | End: 2022-09-10

## 2022-04-06 NOTE — TELEPHONE ENCOUNTER
"Routing refill request to provider for review/approval because:  Labs out of range:  Sodium 130 10/15/2021  Blood pressure    Last Written Prescription Date:  10/15/2021  Last Fill Quantity: 90,  # refills: 1   Last office visit provider:  10/15/2021     Requested Prescriptions   Pending Prescriptions Disp Refills     losartan-hydrochlorothiazide (HYZAAR) 50-12.5 MG tablet [Pharmacy Med Name: LOSARTAN-HCTZ 50-12.5 MG TAB] 90 tablet 1     Sig: TAKE 1 TABLET BY MOUTH EVERY DAY       Angiotensin-II Receptors Failed - 4/6/2022  8:40 AM        Failed - Last blood pressure under 140/90 in past 12 months     BP Readings from Last 3 Encounters:   04/04/22 (!) 188/100   10/15/21 (!) 142/82 07/31/20 (!) 164/88                 Passed - Recent (12 mo) or future (30 days) visit within the authorizing provider's specialty     Patient has had an office visit with the authorizing provider or a provider within the authorizing providers department within the previous 12 mos or has a future within next 30 days. See \"Patient Info\" tab in inbasket, or \"Choose Columns\" in Meds & Orders section of the refill encounter.              Passed - Medication is active on med list        Passed - Patient is age 18 or older        Passed - No active pregnancy on record        Passed - Normal serum creatinine on file in past 12 months     Recent Labs   Lab Test 10/15/21  1319   CR 0.71       Ok to refill medication if creatinine is low          Passed - Normal serum potassium on file in past 12 months     Recent Labs   Lab Test 10/15/21  1319   POTASSIUM 4.7                    Passed - No positive pregnancy test in past 12 months       Diuretics (Including Combos) Protocol Failed - 4/6/2022  8:40 AM        Failed - Blood pressure under 140/90 in past 12 months     BP Readings from Last 3 Encounters:   04/04/22 (!) 188/100   10/15/21 (!) 142/82   07/31/20 (!) 164/88                 Failed - Normal serum sodium on file in past 12 months     Recent " "Labs   Lab Test 10/15/21  1319   *              Passed - Recent (12 mo) or future (30 days) visit within the authorizing provider's specialty     Patient has had an office visit with the authorizing provider or a provider within the authorizing providers department within the previous 12 mos or has a future within next 30 days. See \"Patient Info\" tab in inbasket, or \"Choose Columns\" in Meds & Orders section of the refill encounter.              Passed - Medication is active on med list        Passed - Patient is age 18 or older        Passed - No active pregancy on record        Passed - Normal serum creatinine on file in past 12 months     Recent Labs   Lab Test 10/15/21  1319   CR 0.71              Passed - Normal serum potassium on file in past 12 months     Recent Labs   Lab Test 10/15/21  1319   POTASSIUM 4.7                    Passed - No positive pregnancy test in past 12 months             Beth Stevens RN 04/06/22 8:40 AM  "

## 2022-04-07 NOTE — TELEPHONE ENCOUNTER
Attempted to contact patient to get updates regarding blood pressure and medications per Dr Rodriguez's request. No answer. Left message to call clinic and update.

## 2022-04-08 ENCOUNTER — MYC MEDICAL ADVICE (OUTPATIENT)
Dept: INTERNAL MEDICINE | Facility: CLINIC | Age: 77
End: 2022-04-08
Payer: COMMERCIAL

## 2022-04-08 DIAGNOSIS — I10 ESSENTIAL HYPERTENSION: ICD-10-CM

## 2022-04-08 NOTE — TELEPHONE ENCOUNTER
"Spoke with patient who states she is feeling alright but her blood pressure is high. States \"I know I run high to begin with.\" States she took her blood pressure twice today and the results were as follows:    10 am right arm - 188/98  10 am left arm - 179/96  11 am left arm - 174/99    Denies any headaches or visual disturbances. States she has been under a lot of stress with the recent passing of both her  and her sister. States she cannot tell if any of her BP meds are helping. States she has had a lot of anxiety lately and is wondering if that has something to do with her high blood pressures. Writer encouraged patient to do what she can to decrease stress and monitored for any adverse signs of elevated BP. Writer encouraged patient to call clinic with any further concerns.  "

## 2022-04-19 NOTE — TELEPHONE ENCOUNTER
Contacted patient and informed of PCP recommendation.  Patient will increase to 1.5 tabs (7.5 mg) daily.  She was advised to call the clinic if she is going to run out before next appointment as well as if she experiences any swelling of lower extremities.  Patient verbalized understanding and agreed with plan.

## 2022-04-19 NOTE — TELEPHONE ENCOUNTER
"Contacted patient to assess home bp.  Patient reports she has been taking the amlodipine 5 mg daily since 4/7/22.  Her bp this morning was 156/81.  She reports the lowest it has been systolic is 152.  In general she reports it being in the 150s/70s-80s.  She feels good, no swelling or side effects noted.  She also reports that \"I feel less anxious in the mornings as well.\"  Overall she is very pleased with the medication.  She also asked that writer schedule her to see PCP as her visit on 5/13 was cancelled.  Writer rescheduled her for 5/18 at 720 am.          "

## 2022-04-19 NOTE — TELEPHONE ENCOUNTER
Radha Rodriguez MD  Spmw Rn Pool 37 minutes ago (3:22 PM)     TC    Yes, lets have her increase the amlodipine to 1-1/2 tablets and monitor for lower extremity edema.  Thank you.    Message text

## 2022-05-02 DIAGNOSIS — E78.00 PURE HYPERCHOLESTEROLEMIA: ICD-10-CM

## 2022-05-04 RX ORDER — ATORVASTATIN CALCIUM 80 MG/1
TABLET, FILM COATED ORAL
Qty: 90 TABLET | Refills: 2 | Status: SHIPPED | OUTPATIENT
Start: 2022-05-04 | End: 2022-12-01

## 2022-05-05 NOTE — TELEPHONE ENCOUNTER
"  Routing refill request to provider for review/approval because:  Labs not current:  LDL    Last Written Prescription Date:  7/22/21  Last Fill Quantity: 90,  # refills: 2   Last office visit provider:  10/15/21     Requested Prescriptions   Pending Prescriptions Disp Refills     atorvastatin (LIPITOR) 80 MG tablet [Pharmacy Med Name: ATORVASTATIN 80 MG TABLET] 90 tablet 2     Sig: TAKE 1 TABLET BY MOUTH EVERYDAY AT BEDTIME       Statins Protocol Failed - 5/2/2022 12:06 AM        Failed - LDL on file in past 12 months     Recent Labs   Lab Test 03/12/19  0919   LDL 75             Passed - No abnormal creatine kinase in past 12 months     No lab results found.             Passed - Recent (12 mo) or future (30 days) visit within the authorizing provider's specialty     Patient has had an office visit with the authorizing provider or a provider within the authorizing providers department within the previous 12 mos or has a future within next 30 days. See \"Patient Info\" tab in inbasket, or \"Choose Columns\" in Meds & Orders section of the refill encounter.              Passed - Medication is active on med list        Passed - Patient is age 18 or older        Passed - No active pregnancy on record        Passed - No positive pregnancy test in past 12 months             Kori Mora 05/04/22 7:47 PM  "

## 2022-05-18 ENCOUNTER — OFFICE VISIT (OUTPATIENT)
Dept: INTERNAL MEDICINE | Facility: CLINIC | Age: 77
End: 2022-05-18
Payer: COMMERCIAL

## 2022-05-18 VITALS
HEART RATE: 88 BPM | SYSTOLIC BLOOD PRESSURE: 130 MMHG | OXYGEN SATURATION: 99 % | DIASTOLIC BLOOD PRESSURE: 58 MMHG | WEIGHT: 130 LBS | RESPIRATION RATE: 14 BRPM | BODY MASS INDEX: 21.97 KG/M2

## 2022-05-18 DIAGNOSIS — I10 ESSENTIAL HYPERTENSION: ICD-10-CM

## 2022-05-18 DIAGNOSIS — Z23 HIGH PRIORITY FOR 2019-NCOV VACCINE: ICD-10-CM

## 2022-05-18 DIAGNOSIS — M25.551 HIP PAIN, RIGHT: ICD-10-CM

## 2022-05-18 DIAGNOSIS — E78.00 PURE HYPERCHOLESTEROLEMIA: ICD-10-CM

## 2022-05-18 DIAGNOSIS — G89.29 CHRONIC RIGHT-SIDED LOW BACK PAIN, UNSPECIFIED WHETHER SCIATICA PRESENT: ICD-10-CM

## 2022-05-18 DIAGNOSIS — M54.50 CHRONIC RIGHT-SIDED LOW BACK PAIN, UNSPECIFIED WHETHER SCIATICA PRESENT: ICD-10-CM

## 2022-05-18 DIAGNOSIS — E87.1 HYPONATREMIA: ICD-10-CM

## 2022-05-18 DIAGNOSIS — F43.21 GRIEF: Primary | ICD-10-CM

## 2022-05-18 LAB
ANION GAP SERPL CALCULATED.3IONS-SCNC: 9 MMOL/L (ref 5–18)
BUN SERPL-MCNC: 11 MG/DL (ref 8–28)
CALCIUM SERPL-MCNC: 9.5 MG/DL (ref 8.5–10.5)
CHLORIDE BLD-SCNC: 92 MMOL/L (ref 98–107)
CO2 SERPL-SCNC: 28 MMOL/L (ref 22–31)
CREAT SERPL-MCNC: 0.75 MG/DL (ref 0.6–1.1)
GFR SERPL CREATININE-BSD FRML MDRD: 83 ML/MIN/1.73M2
GLUCOSE BLD-MCNC: 69 MG/DL (ref 70–125)
POTASSIUM BLD-SCNC: 4.3 MMOL/L (ref 3.5–5)
SODIUM SERPL-SCNC: 129 MMOL/L (ref 136–145)

## 2022-05-18 PROCEDURE — 36415 COLL VENOUS BLD VENIPUNCTURE: CPT | Performed by: INTERNAL MEDICINE

## 2022-05-18 PROCEDURE — 0054A COVID-19,PF,PFIZER (12+ YRS): CPT | Performed by: INTERNAL MEDICINE

## 2022-05-18 PROCEDURE — 80048 BASIC METABOLIC PNL TOTAL CA: CPT | Performed by: INTERNAL MEDICINE

## 2022-05-18 PROCEDURE — 91305 COVID-19,PF,PFIZER (12+ YRS): CPT | Performed by: INTERNAL MEDICINE

## 2022-05-18 PROCEDURE — 99214 OFFICE O/P EST MOD 30 MIN: CPT | Mod: 25 | Performed by: INTERNAL MEDICINE

## 2022-05-18 RX ORDER — PREDNISONE 20 MG/1
40 TABLET ORAL DAILY
Qty: 10 TABLET | Refills: 0 | Status: SHIPPED | OUTPATIENT
Start: 2022-05-18 | End: 2023-02-13

## 2022-05-18 RX ORDER — LOSARTAN POTASSIUM AND HYDROCHLOROTHIAZIDE 12.5; 5 MG/1; MG/1
TABLET ORAL
Start: 2022-05-18 | End: 2023-02-13

## 2022-05-18 RX ORDER — AMLODIPINE BESYLATE 10 MG/1
10 TABLET ORAL DAILY
Qty: 90 TABLET | Refills: 1 | Status: SHIPPED | OUTPATIENT
Start: 2022-05-18 | End: 2022-09-10

## 2022-05-18 RX ORDER — AMLODIPINE BESYLATE 5 MG/1
7.5 TABLET ORAL DAILY
Qty: 145 TABLET | Refills: 3 | Status: SHIPPED | OUTPATIENT
Start: 2022-05-18 | End: 2022-05-18

## 2022-05-18 ASSESSMENT — PAIN SCALES - GENERAL: PAINLEVEL: EXTREME PAIN (8)

## 2022-05-18 NOTE — PROGRESS NOTES
[facility] Follow Up Note    Assessment/Plan:  1. Grief  Since last visit-both  and sister have passed away.  Her  had a sudden cardiac death event.  She continues to grieve.  Her sister's death seem to be somewhat more expected.    She is helping assist with the care of her nephew.    2. Pure hypercholesterolemia  In the setting of a family history of CAD.  Would like to do fasting labs at next visit    3. Hypertension  She has had high blood pressure elevation.  Amlodipine was added and she is currently taking 1-1/2 tablets daily in addition to her losartan.  She is due for a BMP and will proceed.  She had low sodium with last lab draw.  We will focus on reduction of the ARB/diuretic and optimization of the calcium blocker in this instance.  Of note also, she has been doubling up on both Aleve and Advil.  She has been given advice that this can cause a variety of problems for the kidneys.  She will discontinue this immediately.  - amLODIPine (NORVASC) 5 MG tablet; Take 1.5 tablets (7.5 mg) by mouth daily  Dispense: 145 tablet; Refill: 3    4. Hip pain, right  Hip area pain-suspect this is coming from paraspinous/gluteal muscles.  Recommendation: Physical therapy referral/course of prednisone.  Discontinuation of NSAIDs and use of Tylenol arthritis.  She can see Dr. Purdy at [facility] if she would like.  - Physical Therapy Referral; Future  - predniSONE (DELTASONE) 20 MG tablet; Take 2 tablets (40 mg) by mouth daily  Dispense: 10 tablet; Refill: 0    5. Chronic right-sided low back pain, unspecified whether sciatica present  As above  - Physical Therapy Referral; Future  - predniSONE (DELTASONE) 20 MG tablet; Take 2 tablets (40 mg) by mouth daily  Dispense: 10 tablet; Refill: 0    6. Essential hypertension  Improved blood pressure today  - amLODIPine (NORVASC) 5 MG tablet; Take 1.5 tablets (7.5 mg) by mouth daily  Dispense: 145 tablet; Refill: 3    7. High priority for  2019-nCoV vaccine    - COVID-19,PF,PFIZER (12+ Yrs GRAY LABEL)      Follow-up in 2 months/have discussed coronary calcium scanning for cardiac risk factor stratification and will address this at next visit    Radha Rodriguez MD, MD    Chief Complaint:  Chief Complaint   Patient presents with     Recheck Medication     Hypertension     Running high  Some anxiety     Imm/Inj     Covid boster  shingrix     Imm/Inj     COVID-19 VACCINE       History of Present Illness:  Cathy is a 76 year old female who is here today for follow-up of elevated blood pressure.  Of note, she has had a very stressful 6 months.  Her  Bucky passed away with a sudden cardiac death in November.  Shortly thereafter, her sister passed away.  She had a wellness visit via a nurse on telehealth.  Her blood pressure was noted to be elevated and we made some blood pressure medicine changes via [place] and she is here today.  She denies any chest pain or shortness of breath.  She does report some right hip/lower gluteal pain.  This has been present for about 6 weeks.  Of note, she has been taking both Aleve twice daily and Advil for pain.  She was unaware that this might cause problems with regard to kidney function/blood pressure/etc.        Review of Systems:  A comprehensive review of systems was performed and was otherwise negative    PFSH:  Social History: Recently   History   Smoking Status     Never Smoker   Smokeless Tobacco     Never Used       Past History:   Past Medical History:   Diagnosis Date     Allergic rhinitis      Cataract      Hyperlipidemia      Hypertension        Current Outpatient Medications   Medication Sig Dispense Refill     amLODIPine (NORVASC) 5 MG tablet Take 1.5 tablets (7.5 mg) by mouth daily 145 tablet 3     aspirin 81 MG EC tablet [ASPIRIN 81 MG EC TABLET] Take 81 mg by mouth daily.       atorvastatin (LIPITOR) 80 MG tablet TAKE 1 TABLET BY MOUTH EVERYDAY AT BEDTIME 90 tablet 2     cetirizine (ZYRTEC)  10 MG tablet Take 10 mg by mouth daily       cholecalciferol, vitamin D3, 1,000 unit tablet [CHOLECALCIFEROL, VITAMIN D3, 1,000 UNIT TABLET] Take 2,000 Units by mouth every evening.       docusate sodium (COLACE) 100 MG capsule [DOCUSATE SODIUM (COLACE) 100 MG CAPSULE] Take 1 capsule (100 mg total) by mouth 2 (two) times a day as needed for constipation.  0     hydrocortisone (ANUSOL-HC) 2.5 % rectal cream [HYDROCORTISONE (ANUSOL-HC) 2.5 % RECTAL CREAM] USE AS DIRECTED DAILY AS NEEDED 30 g 3     losartan-hydrochlorothiazide (HYZAAR) 50-12.5 MG tablet TAKE 1 TABLET BY MOUTH EVERY DAY 90 tablet 1     predniSONE (DELTASONE) 20 MG tablet Take 2 tablets (40 mg) by mouth daily 10 tablet 0     triamcinolone acetonide (NASACORT AQ NASL) [TRIAMCINOLONE ACETONIDE (NASACORT AQ NASL)] into each nostril.       valACYclovir (VALTREX) 1000 mg tablet TAKE 1 TABLET BY MOUTH EVERY DAY 90 tablet 3       Family History:    Physical Exam:  General Appearance:   She appears at baseline and in no acute distress  Vitals:    05/18/22 0723   BP: 130/58   BP Location: Left arm   Patient Position: Sitting   Cuff Size: Adult Regular   Pulse: 88   Resp: 14   SpO2: 99%   Weight: 59 kg (130 lb)     Wt Readings from Last 3 Encounters:   05/18/22 59 kg (130 lb)   04/04/22 57.6 kg (127 lb)   10/15/21 60.3 kg (133 lb)     Body mass index is 21.97 kg/m .    Lungs and cardiac exam are unremarkable  Right hip is examined.  She has full range of motion without pain.  There is some tenderness in the deep right gluteal area.  Straight leg raising is negative    Answers for HPI/ROS submitted by the patient on 5/18/2022  Do you check your blood pressure regularly outside of the clinic?: Yes  Are your blood pressures ever more than 140 on the top number (systolic) OR more than 90 on the bottom number (diastolic)? (For example, greater than 140/90): Yes  Are you following a low salt diet?: Yes

## 2022-05-18 NOTE — TELEPHONE ENCOUNTER
Cathy, your sodium is even lower.  Therefore, I would like you to raise the dose of the amlodipine to 10 mg.  I will change your prescription.  In the interim, you can use 2 of the 5 mg tablets.  Also, I want you to reduce the dose of your losartan/hydrochlorothiazide to half tablet.  Let me know if it is difficult to break this pill in half.  Hopefully this, along with discontinuing the Aleve will help your sodium normalized.  I need you to return for follow-up blood pressure check as we discussed in the office.  When you return in 2 months, we will recheck these labs.

## 2022-05-31 ENCOUNTER — TRANSFERRED RECORDS (OUTPATIENT)
Dept: HEALTH INFORMATION MANAGEMENT | Facility: CLINIC | Age: 77
End: 2022-05-31
Payer: COMMERCIAL

## 2022-06-01 DIAGNOSIS — I10 ESSENTIAL HYPERTENSION: ICD-10-CM

## 2022-06-02 RX ORDER — AMLODIPINE BESYLATE 5 MG/1
TABLET ORAL
Qty: 60 TABLET | Refills: 1 | Status: SHIPPED | OUTPATIENT
Start: 2022-06-02 | End: 2022-07-07

## 2022-06-03 NOTE — TELEPHONE ENCOUNTER
"Routing refill request to provider for review/approval because:  Drug not active on patient's medication list    Last Written Prescription Date:    Last Fill Quantity: ,  # refills:    Last office visit provider:  5/18/22     Requested Prescriptions   Pending Prescriptions Disp Refills     amLODIPine (NORVASC) 5 MG tablet [Pharmacy Med Name: AMLODIPINE BESYLATE 5 MG TAB] 60 tablet 1     Sig: TAKE 1 TABLET BY MOUTH EVERY DAY       Calcium Channel Blockers Protocol  Passed - 6/1/2022  1:31 PM        Passed - Blood pressure under 140/90 in past 12 months     BP Readings from Last 3 Encounters:   05/18/22 130/58   04/04/22 (!) 188/100   10/15/21 (!) 142/82                 Passed - Recent (12 mo) or future (30 days) visit within the authorizing provider's specialty     Patient has had an office visit with the authorizing provider or a provider within the authorizing providers department within the previous 12 mos or has a future within next 30 days. See \"Patient Info\" tab in inbasket, or \"Choose Columns\" in Meds & Orders section of the refill encounter.              Passed - Medication is active on med list        Passed - Patient is age 18 or older        Passed - No active pregnancy on record        Passed - Normal serum creatinine on file in past 12 months     Recent Labs   Lab Test 05/18/22  0812   CR 0.75       Ok to refill medication if creatinine is low          Passed - No positive pregnancy test in past 12 months             Jacey Kwan, RN 06/02/22 7:41 PM  "

## 2022-06-06 ENCOUNTER — TRANSFERRED RECORDS (OUTPATIENT)
Dept: HEALTH INFORMATION MANAGEMENT | Facility: CLINIC | Age: 77
End: 2022-06-06
Payer: COMMERCIAL

## 2022-06-13 ENCOUNTER — TRANSFERRED RECORDS (OUTPATIENT)
Dept: HEALTH INFORMATION MANAGEMENT | Facility: CLINIC | Age: 77
End: 2022-06-13
Payer: COMMERCIAL

## 2022-06-29 ENCOUNTER — TELEPHONE (OUTPATIENT)
Dept: INTERNAL MEDICINE | Facility: CLINIC | Age: 77
End: 2022-06-29

## 2022-06-29 NOTE — TELEPHONE ENCOUNTER
Reason for Call:  Other call back    Detailed comments: pt had an increase of:  Amlodipine and is now noticing that both ankles are swelling left more than right    Please advise    Phone Number Patient can be reached at: Home number on file [phone] (home)    Best Time: anytime    Can we leave a detailed message on this number? YES    Call taken on 6/29/2022 at 10:55 AM by Nicki Atkinson

## 2022-06-30 NOTE — TELEPHONE ENCOUNTER
Please let her know that I will pass this message on to her pcp, returning to clinic tomorrow.  Please reassure that this is relatively common and she should not be too worried. Dr Rodriguez can comment on next steps.    Mateo Gomez MD on 6/30/2022 at 10:36 AM

## 2022-07-06 DIAGNOSIS — I10 ESSENTIAL HYPERTENSION: ICD-10-CM

## 2022-07-07 RX ORDER — AMLODIPINE BESYLATE 5 MG/1
TABLET ORAL
Qty: 90 TABLET | Refills: 1 | Status: SHIPPED | OUTPATIENT
Start: 2022-07-07 | End: 2023-02-13

## 2022-07-14 ENCOUNTER — TRANSFERRED RECORDS (OUTPATIENT)
Dept: HEALTH INFORMATION MANAGEMENT | Facility: CLINIC | Age: 77
End: 2022-07-14

## 2022-09-09 DIAGNOSIS — I10 ESSENTIAL HYPERTENSION: ICD-10-CM

## 2022-09-10 RX ORDER — LOSARTAN POTASSIUM AND HYDROCHLOROTHIAZIDE 12.5; 5 MG/1; MG/1
TABLET ORAL
Qty: 90 TABLET | Refills: 1 | Status: SHIPPED | OUTPATIENT
Start: 2022-09-10 | End: 2023-02-13

## 2022-09-10 RX ORDER — AMLODIPINE BESYLATE 10 MG/1
10 TABLET ORAL DAILY
Qty: 90 TABLET | Refills: 1 | Status: SHIPPED | OUTPATIENT
Start: 2022-09-10 | End: 2023-09-13

## 2022-09-10 NOTE — TELEPHONE ENCOUNTER
"Routing refill request to provider for review/approval because:  Labs out of range:  na    Last Written Prescription Date:  4/6/22  Last Fill Quantity: 90,  # refills: 1   Last office visit provider:  5/18/22     Requested Prescriptions   Pending Prescriptions Disp Refills     amLODIPine (NORVASC) 10 MG tablet [Pharmacy Med Name: AMLODIPINE BESYLATE 10 MG TAB] 90 tablet 1     Sig: TAKE 1 TABLET (10 MG) BY MOUTH DAILY.       Calcium Channel Blockers Protocol  Passed - 9/9/2022 12:29 PM        Passed - Blood pressure under 140/90 in past 12 months     BP Readings from Last 3 Encounters:   05/18/22 130/58   04/04/22 (!) 188/100   10/15/21 (!) 142/82                 Passed - Recent (12 mo) or future (30 days) visit within the authorizing provider's specialty     Patient has had an office visit with the authorizing provider or a provider within the authorizing providers department within the previous 12 mos or has a future within next 30 days. See \"Patient Info\" tab in inbasket, or \"Choose Columns\" in Meds & Orders section of the refill encounter.              Passed - Medication is active on med list        Passed - Patient is age 18 or older        Passed - No active pregnancy on record        Passed - Normal serum creatinine on file in past 12 months     Recent Labs   Lab Test 05/18/22  0812   CR 0.75       Ok to refill medication if creatinine is low          Passed - No positive pregnancy test in past 12 months           losartan-hydrochlorothiazide (HYZAAR) 50-12.5 MG tablet [Pharmacy Med Name: LOSARTAN-HCTZ 50-12.5 MG TAB] 90 tablet 1     Sig: TAKE 1 TABLET BY MOUTH EVERY DAY       Diuretics (Including Combos) Protocol Failed - 9/9/2022 12:29 PM        Failed - Normal serum sodium on file in past 12 months     Recent Labs   Lab Test 05/18/22  0812   *              Passed - Blood pressure under 140/90 in past 12 months     BP Readings from Last 3 Encounters:   05/18/22 130/58   04/04/22 (!) 188/100   10/15/21 " "(!) 142/82                 Passed - Recent (12 mo) or future (30 days) visit within the authorizing provider's specialty     Patient has had an office visit with the authorizing provider or a provider within the authorizing providers department within the previous 12 mos or has a future within next 30 days. See \"Patient Info\" tab in inbasket, or \"Choose Columns\" in Meds & Orders section of the refill encounter.              Passed - Medication is active on med list        Passed - Patient is age 18 or older        Passed - No active pregancy on record        Passed - Normal serum creatinine on file in past 12 months     Recent Labs   Lab Test 05/18/22  0812   CR 0.75              Passed - Normal serum potassium on file in past 12 months     Recent Labs   Lab Test 05/18/22  0812   POTASSIUM 4.3                    Passed - No positive pregnancy test in past 12 months       Angiotensin-II Receptors Passed - 9/9/2022 12:29 PM        Passed - Last blood pressure under 140/90 in past 12 months     BP Readings from Last 3 Encounters:   05/18/22 130/58   04/04/22 (!) 188/100   10/15/21 (!) 142/82                 Passed - Recent (12 mo) or future (30 days) visit within the authorizing provider's specialty     Patient has had an office visit with the authorizing provider or a provider within the authorizing providers department within the previous 12 mos or has a future within next 30 days. See \"Patient Info\" tab in inbasket, or \"Choose Columns\" in Meds & Orders section of the refill encounter.              Passed - Medication is active on med list        Passed - Patient is age 18 or older        Passed - No active pregnancy on record        Passed - Normal serum creatinine on file in past 12 months     Recent Labs   Lab Test 05/18/22  0812   CR 0.75       Ok to refill medication if creatinine is low          Passed - Normal serum potassium on file in past 12 months     Recent Labs   Lab Test 05/18/22  0812   POTASSIUM 4.3 "                    Passed - No positive pregnancy test in past 12 months             Jacey Kwan, RN 09/10/22 12:06 PM

## 2022-09-29 ENCOUNTER — IMMUNIZATION (OUTPATIENT)
Dept: NURSING | Facility: CLINIC | Age: 77
End: 2022-09-29
Payer: COMMERCIAL

## 2022-09-29 PROCEDURE — G0008 ADMIN INFLUENZA VIRUS VAC: HCPCS

## 2022-09-29 PROCEDURE — 0124A COVID-19,PF,PFIZER BOOSTER BIVALENT: CPT

## 2022-09-29 PROCEDURE — 90662 IIV NO PRSV INCREASED AG IM: CPT

## 2022-09-29 PROCEDURE — 91312 COVID-19,PF,PFIZER BOOSTER BIVALENT: CPT

## 2022-10-04 DIAGNOSIS — I10 ESSENTIAL HYPERTENSION: Primary | ICD-10-CM

## 2022-10-05 RX ORDER — LOSARTAN POTASSIUM AND HYDROCHLOROTHIAZIDE 12.5; 5 MG/1; MG/1
1 TABLET ORAL DAILY
Qty: 90 TABLET | Refills: 0 | Status: SHIPPED | OUTPATIENT
Start: 2022-10-05 | End: 2023-02-13

## 2022-10-27 ENCOUNTER — TRANSFERRED RECORDS (OUTPATIENT)
Dept: HEALTH INFORMATION MANAGEMENT | Facility: CLINIC | Age: 77
End: 2022-10-27

## 2022-11-14 NOTE — TELEPHONE ENCOUNTER
"Routing refill request to provider for review/approval because:  Blood pressure  Early refill request.     Last Written Prescription Date:  6/2/2022  Last Fill Quantity: 60,  # refills: 1   Last office visit provider:  5/18/2022     Requested Prescriptions   Pending Prescriptions Disp Refills     amLODIPine (NORVASC) 5 MG tablet [Pharmacy Med Name: AMLODIPINE BESYLATE 5 MG TAB] 90 tablet 1     Sig: TAKE 1 TABLET BY MOUTH EVERY DAY       Calcium Channel Blockers Protocol  Passed - 7/7/2022 12:45 PM        Passed - Blood pressure under 140/90 in past 12 months     BP Readings from Last 3 Encounters:   05/18/22 130/58   04/04/22 (!) 188/100   10/15/21 (!) 142/82                 Passed - Recent (12 mo) or future (30 days) visit within the authorizing provider's specialty     Patient has had an office visit with the authorizing provider or a provider within the authorizing providers department within the previous 12 mos or has a future within next 30 days. See \"Patient Info\" tab in inbasket, or \"Choose Columns\" in Meds & Orders section of the refill encounter.              Passed - Medication is active on med list        Passed - Patient is age 18 or older        Passed - No active pregnancy on record        Passed - Normal serum creatinine on file in past 12 months     Recent Labs   Lab Test 05/18/22  0812   CR 0.75       Ok to refill medication if creatinine is low          Passed - No positive pregnancy test in past 12 months             Beth Stevens RN 07/07/22 12:45 PM  " Spoke with patient   Aware of results  Denies concerns    Updated health maintenance, surgical history and wait list.

## 2022-11-30 DIAGNOSIS — E78.00 PURE HYPERCHOLESTEROLEMIA: ICD-10-CM

## 2022-12-01 RX ORDER — ATORVASTATIN CALCIUM 80 MG/1
TABLET, FILM COATED ORAL
Qty: 90 TABLET | Refills: 2 | Status: SHIPPED | OUTPATIENT
Start: 2022-12-01 | End: 2023-11-20

## 2022-12-01 NOTE — TELEPHONE ENCOUNTER
"Routing refill request to provider for review/approval because:  Labs not current:  ldl    Last Written Prescription Date:  5/4/22  Last Fill Quantity: 90,  # refills: 2   Last office visit provider:  5/18/22     Requested Prescriptions   Pending Prescriptions Disp Refills     atorvastatin (LIPITOR) 80 MG tablet [Pharmacy Med Name: ATORVASTATIN 80 MG TABLET] 90 tablet 2     Sig: TAKE 1 TABLET BY MOUTH EVERYDAY AT BEDTIME       Statins Protocol Failed - 11/30/2022 10:05 PM        Failed - LDL on file in past 12 months     Recent Labs   Lab Test 03/12/19  0919   LDL 75             Passed - No abnormal creatine kinase in past 12 months     No lab results found.             Passed - Recent (12 mo) or future (30 days) visit within the authorizing provider's specialty     Patient has had an office visit with the authorizing provider or a provider within the authorizing providers department within the previous 12 mos or has a future within next 30 days. See \"Patient Info\" tab in inbasket, or \"Choose Columns\" in Meds & Orders section of the refill encounter.              Passed - Medication is active on med list        Passed - Patient is age 18 or older        Passed - No active pregnancy on record        Passed - No positive pregnancy test in past 12 months             Jacey Kwan RN 12/01/22 10:06 AM  "

## 2023-02-13 ENCOUNTER — OFFICE VISIT (OUTPATIENT)
Dept: INTERNAL MEDICINE | Facility: CLINIC | Age: 78
End: 2023-02-13
Payer: COMMERCIAL

## 2023-02-13 VITALS
RESPIRATION RATE: 19 BRPM | HEIGHT: 64 IN | TEMPERATURE: 97.9 F | WEIGHT: 132.7 LBS | SYSTOLIC BLOOD PRESSURE: 132 MMHG | HEART RATE: 85 BPM | OXYGEN SATURATION: 97 % | DIASTOLIC BLOOD PRESSURE: 62 MMHG | BODY MASS INDEX: 22.65 KG/M2

## 2023-02-13 DIAGNOSIS — I10 ESSENTIAL HYPERTENSION: ICD-10-CM

## 2023-02-13 DIAGNOSIS — B00.9 HERPES SIMPLEX: ICD-10-CM

## 2023-02-13 DIAGNOSIS — G89.29 CHRONIC BILATERAL LOW BACK PAIN WITH BILATERAL SCIATICA: ICD-10-CM

## 2023-02-13 DIAGNOSIS — I65.23 BILATERAL CAROTID ARTERY STENOSIS: ICD-10-CM

## 2023-02-13 DIAGNOSIS — J30.1 NON-SEASONAL ALLERGIC RHINITIS DUE TO POLLEN: ICD-10-CM

## 2023-02-13 DIAGNOSIS — E87.1 HYPONATREMIA: ICD-10-CM

## 2023-02-13 DIAGNOSIS — M54.41 CHRONIC BILATERAL LOW BACK PAIN WITH BILATERAL SCIATICA: ICD-10-CM

## 2023-02-13 DIAGNOSIS — E78.00 HYPERCHOLESTEROLEMIA: ICD-10-CM

## 2023-02-13 DIAGNOSIS — M54.42 CHRONIC BILATERAL LOW BACK PAIN WITH BILATERAL SCIATICA: ICD-10-CM

## 2023-02-13 DIAGNOSIS — Z78.0 ASYMPTOMATIC POSTMENOPAUSAL ESTROGEN DEFICIENCY: Primary | ICD-10-CM

## 2023-02-13 PROBLEM — Z96.612 STATUS POST TOTAL REPLACEMENT OF LEFT SHOULDER: Status: RESOLVED | Noted: 2018-01-09 | Resolved: 2023-02-13

## 2023-02-13 LAB
ALBUMIN SERPL BCG-MCNC: 4.9 G/DL (ref 3.5–5.2)
ALBUMIN UR-MCNC: NEGATIVE MG/DL
ALP SERPL-CCNC: 87 U/L (ref 35–104)
ALT SERPL W P-5'-P-CCNC: 28 U/L (ref 10–35)
ANION GAP SERPL CALCULATED.3IONS-SCNC: 14 MMOL/L (ref 7–15)
APPEARANCE UR: CLEAR
AST SERPL W P-5'-P-CCNC: 45 U/L (ref 10–35)
BILIRUB SERPL-MCNC: 0.5 MG/DL
BILIRUB UR QL STRIP: NEGATIVE
BUN SERPL-MCNC: 14.4 MG/DL (ref 8–23)
CALCIUM SERPL-MCNC: 10.4 MG/DL (ref 8.8–10.2)
CHLORIDE SERPL-SCNC: 94 MMOL/L (ref 98–107)
CHOLEST SERPL-MCNC: 199 MG/DL
COLOR UR AUTO: YELLOW
CREAT SERPL-MCNC: 0.7 MG/DL (ref 0.51–0.95)
DEPRECATED HCO3 PLAS-SCNC: 24 MMOL/L (ref 22–29)
ERYTHROCYTE [DISTWIDTH] IN BLOOD BY AUTOMATED COUNT: 13.1 % (ref 10–15)
GFR SERPL CREATININE-BSD FRML MDRD: 89 ML/MIN/1.73M2
GLUCOSE SERPL-MCNC: 95 MG/DL (ref 70–99)
GLUCOSE UR STRIP-MCNC: NEGATIVE MG/DL
HCT VFR BLD AUTO: 38 % (ref 35–47)
HDLC SERPL-MCNC: 103 MG/DL
HGB BLD-MCNC: 12.4 G/DL (ref 11.7–15.7)
HGB UR QL STRIP: NEGATIVE
KETONES UR STRIP-MCNC: NEGATIVE MG/DL
LDLC SERPL CALC-MCNC: 86 MG/DL
LEUKOCYTE ESTERASE UR QL STRIP: NEGATIVE
MCH RBC QN AUTO: 32.7 PG (ref 26.5–33)
MCHC RBC AUTO-ENTMCNC: 32.6 G/DL (ref 31.5–36.5)
MCV RBC AUTO: 100 FL (ref 78–100)
NITRATE UR QL: NEGATIVE
NONHDLC SERPL-MCNC: 96 MG/DL
PH UR STRIP: 7.5 [PH] (ref 5–8)
PLATELET # BLD AUTO: 230 10E3/UL (ref 150–450)
POTASSIUM SERPL-SCNC: 5 MMOL/L (ref 3.4–5.3)
PROT SERPL-MCNC: 7.6 G/DL (ref 6.4–8.3)
RBC # BLD AUTO: 3.79 10E6/UL (ref 3.8–5.2)
SODIUM SERPL-SCNC: 132 MMOL/L (ref 136–145)
SP GR UR STRIP: 1.01 (ref 1–1.03)
TRIGL SERPL-MCNC: 50 MG/DL
TSH SERPL DL<=0.005 MIU/L-ACNC: 0.9 UIU/ML (ref 0.3–4.2)
UROBILINOGEN UR STRIP-ACNC: 0.2 E.U./DL
WBC # BLD AUTO: 5.5 10E3/UL (ref 4–11)

## 2023-02-13 PROCEDURE — 36415 COLL VENOUS BLD VENIPUNCTURE: CPT | Performed by: INTERNAL MEDICINE

## 2023-02-13 PROCEDURE — 80061 LIPID PANEL: CPT | Performed by: INTERNAL MEDICINE

## 2023-02-13 PROCEDURE — 85027 COMPLETE CBC AUTOMATED: CPT | Performed by: INTERNAL MEDICINE

## 2023-02-13 PROCEDURE — 99215 OFFICE O/P EST HI 40 MIN: CPT | Performed by: INTERNAL MEDICINE

## 2023-02-13 PROCEDURE — 80053 COMPREHEN METABOLIC PANEL: CPT | Performed by: INTERNAL MEDICINE

## 2023-02-13 PROCEDURE — 81003 URINALYSIS AUTO W/O SCOPE: CPT | Performed by: INTERNAL MEDICINE

## 2023-02-13 PROCEDURE — 84443 ASSAY THYROID STIM HORMONE: CPT | Performed by: INTERNAL MEDICINE

## 2023-02-13 RX ORDER — LOSARTAN POTASSIUM 100 MG/1
100 TABLET ORAL DAILY
Qty: 90 TABLET | Refills: 4 | Status: SHIPPED | OUTPATIENT
Start: 2023-02-13 | End: 2023-12-29

## 2023-02-13 RX ORDER — VALACYCLOVIR HYDROCHLORIDE 1 G/1
1000 TABLET, FILM COATED ORAL DAILY
Qty: 90 TABLET | Refills: 3 | Status: SHIPPED | OUTPATIENT
Start: 2023-02-13 | End: 2023-11-30

## 2023-02-13 RX ORDER — FLUOROURACIL 50 MG/G
CREAM TOPICAL
COMMUNITY
Start: 2022-05-25

## 2023-02-13 RX ORDER — NYSTATIN 100000 U/G
OINTMENT TOPICAL
COMMUNITY
Start: 2023-02-04 | End: 2023-02-13

## 2023-02-13 RX ORDER — ZOSTER VACCINE RECOMBINANT, ADJUVANTED 50 MCG/0.5
KIT INTRAMUSCULAR
COMMUNITY
Start: 2022-06-24 | End: 2023-11-30

## 2023-02-13 RX ORDER — GABAPENTIN 100 MG/1
CAPSULE ORAL 3 TIMES DAILY
COMMUNITY
Start: 2022-11-30

## 2023-02-13 RX ORDER — METHYLPREDNISOLONE 4 MG
TABLET, DOSE PACK ORAL
COMMUNITY
Start: 2022-06-06 | End: 2023-02-13

## 2023-02-13 RX ORDER — MELOXICAM 15 MG/1
15 TABLET ORAL
COMMUNITY
Start: 2022-12-02 | End: 2024-07-19

## 2023-02-13 NOTE — PROGRESS NOTES
Office Visit - New Patient   Cathy Beckwith   77 year old  female    Date of visit: 2/13/2023  Physician: Zeb Damon MD     Assessment and Plan   1. Asymptomatic postmenopausal estrogen deficiency  - DX Hip/Pelvis/Spine; Future    2. Herpes simplex  - valACYclovir (VALTREX) 1000 mg tablet; Take 1 tablet (1,000 mg) by mouth daily  Dispense: 90 tablet; Refill: 3    3. Essential hypertension  Her blood pressure is controlled but her sodium is quite low and I would like her to stop hydrochlorothiazide, start losartan and come in for repeat BMP a week after making this change.  She might wait until after her vacation to do this.  - losartan (COZAAR) 100 MG tablet; Take 1 tablet (100 mg) by mouth daily  Dispense: 90 tablet; Refill: 4    4. Hyponatremia  As above    5. Hypercholesterolemia  Continue atorvastatin 80 mg daily  - CBC with platelets; Future  - Comprehensive metabolic panel; Future  - Lipid panel reflex to direct LDL Fasting; Future  - TSH with free T4 reflex; Future  - UA reflex to Microscopic and Culture; Future  - CBC with platelets  - Comprehensive metabolic panel  - Lipid panel reflex to direct LDL Fasting  - TSH with free T4 reflex  - UA reflex to Microscopic and Culture    6. Chronic bilateral low back pain with bilateral sciatica  Stable, takes gabapentin, meloxicam    7. Non-seasonal allergic rhinitis due to pollen  Stable    8. Bilateral carotid artery stenosis  Continue with high-dose statin, continue with aspirin, check labs as above    Return in about 3 months (around 5/13/2023) for Routine preventive.     Chief Complaint   Chief Complaint   Patient presents with     Recheck Medication     Establish Care        Patient Profile   Social History     Social History Narrative     ([place]). Retired from [employer] and [employer]. Daughter, Lazara and son Morgan (both live in [place]).  Three grandchildren.          Past Medical History   Patient Active Problem List   Diagnosis      Chronic bilateral low back pain with bilateral sciatica     Hypercholesterolemia     Hypertension     Non-seasonal allergic rhinitis due to pollen     Status post total replacement of left shoulder     Bilateral carotid artery stenosis       Past Surgical History  She has a past surgical history that includes LIGATE FALLOPIAN TUBE; Bunionectomy; RECONSTR TOTAL SHOULDER IMPLANT (Left, 01/09/2018); Pr Nasal/Sinus Endoscopy,B[facility] Polyp/Debrid (Bilateral, 06/18/2019); and Radiofrequency Ablation Nerves.     History of Present Illness   This 77 year old woman comes in to [facility] care.  Her medical history is reviewed from electronic medical record was updated and is reflected in this note.    Review of Systems: A comprehensive review of systems was negative except as noted.     Medications and Allergies   Current Outpatient Medications   Medication Sig Dispense Refill     amLODIPine (NORVASC) 10 MG tablet TAKE 1 TABLET (10 MG) BY MOUTH DAILY. 90 tablet 1     atorvastatin (LIPITOR) 80 MG tablet TAKE 1 TABLET BY MOUTH EVERYDAY AT BEDTIME 90 tablet 2     cetirizine (ZYRTEC) 10 MG tablet Take 10 mg by mouth daily       cholecalciferol, vitamin D3, 1,000 unit tablet [CHOLECALCIFEROL, VITAMIN D3, 1,000 UNIT TABLET] Take 2,000 Units by mouth every evening.       gabapentin (NEURONTIN) 100 MG capsule Take by mouth 3 times daily       hydrocortisone (ANUSOL-HC) 2.5 % rectal cream [HYDROCORTISONE (ANUSOL-HC) 2.5 % RECTAL CREAM] USE AS DIRECTED DAILY AS NEEDED 30 g 3     losartan (COZAAR) 100 MG tablet Take 1 tablet (100 mg) by mouth daily 90 tablet 4     meloxicam (MOBIC) 15 MG tablet Take 15 mg by mouth daily with food       SHINGRIX injection        valACYclovir (VALTREX) 1000 mg tablet Take 1 tablet (1,000 mg) by mouth daily 90 tablet 3     aspirin 81 MG EC tablet [ASPIRIN 81 MG EC TABLET] Take 81 mg by mouth daily.       docusate sodium (COLACE) 100 MG capsule [DOCUSATE SODIUM (COLACE) 100 MG CAPSULE] Take 1 capsule  "(100 mg total) by mouth 2 (two) times a day as needed for constipation.  0     fluorouracil (EFUDEX) 5 % external cream PLEASE SEE ATTACHED FOR DETAILED DIRECTIONS       triamcinolone acetonide (NASACORT AQ NASL) [TRIAMCINOLONE ACETONIDE (NASACORT AQ NASL)] into each nostril. (Patient not taking: Reported on 2/13/2023)       Allergies   Allergen Reactions     Ace Inhibitors Unknown     Didn't feel well     Codeine Nausea        Family and Social History   Family History   Problem Relation Age of Onset     Heart Disease Mother      Hyperlipidemia Mother      Hypertension Father      Chronic Obstructive Pulmonary Disease Father      Cerebrovascular Disease Father      Heart Disease Sister      Anorexia nervosa Sister      No Known Problems Sister      No Known Problems Son      No Known Problems Daughter         Social History     Tobacco Use     Smoking status: Never     Smokeless tobacco: Never   Substance Use Topics     Alcohol use: Yes     Comment: glass of wine daily      Drug use: No        Physical Exam   General Appearance:   No acute distress    /62 (BP Location: Left arm, Patient Position: Sitting, Cuff Size: Adult Large)   Pulse 85   Temp 97.9  F (36.6  C) (Tympanic)   Resp 19   Ht 1.619 m (5' 3.75\")   Wt 60.2 kg (132 lb 11.2 oz)   SpO2 97%   BMI 22.96 kg/m         Additional Information   Time: Over 40 minutes were spent the patient including chart review, history, examination, counseling and documentation     Zeb Damon MD  Internal Medicine  Contact me at None    Answers for HPI/ROS submitted by the patient on 2/13/2023  What is the reason for your visit today? : ESTABLISH CARE  How many servings of fruits and vegetables do you eat daily?: 2-3  On average, how many sweetened beverages do you drink each day (Examples: soda, juice, sweet tea, etc.  Do NOT count diet or artificially sweetened beverages)?: 2  How many minutes a day do you exercise enough to make your heart beat faster?: 30 " to 60  How many days a week do you exercise enough to make your heart beat faster?: 5  How many days per week do you miss taking your medication?: 0

## 2023-05-20 ENCOUNTER — HEALTH MAINTENANCE LETTER (OUTPATIENT)
Age: 78
End: 2023-05-20

## 2023-06-29 ENCOUNTER — TELEPHONE (OUTPATIENT)
Dept: INTERNAL MEDICINE | Facility: CLINIC | Age: 78
End: 2023-06-29

## 2023-07-06 ENCOUNTER — TELEPHONE (OUTPATIENT)
Dept: INTERNAL MEDICINE | Facility: CLINIC | Age: 78
End: 2023-07-06
Payer: COMMERCIAL

## 2023-07-06 DIAGNOSIS — I10 ESSENTIAL HYPERTENSION: ICD-10-CM

## 2023-07-06 RX ORDER — LOSARTAN POTASSIUM AND HYDROCHLOROTHIAZIDE 12.5; 5 MG/1; MG/1
1 TABLET ORAL DAILY
Qty: 90 TABLET | Refills: 1 | Status: SHIPPED | OUTPATIENT
Start: 2023-07-06 | End: 2023-11-30

## 2023-07-06 NOTE — TELEPHONE ENCOUNTER
Spoke with patient who states she has not yet made the switch from Hyzaar to Cozaar. States she knows she was supposed to but she has not yet done so and is requesting a 30 day supply of Hyzaar to last her until she will return in mid August.

## 2023-07-06 NOTE — TELEPHONE ENCOUNTER
General Call    Contacts       Type Contact Phone/Fax    07/06/2023 09:25 AM CDT Phone (Incoming) Cathy Beckwith (Self) [phone] (M)        Reason for Call:  6/29/23 Hyzaar Refill request    What are your questions or concerns:  Patient is going out of town this afternoon(7/623), will be back Tuesday, and then leaves again for a month and is requesting to be able to pickup her medication today. See 6/29/23 refill request encounter    Patient would like a phone call if there are any issues or concerns.    Date of last appointment with provider: n/a     would you prefer to receive a phone call?:   Patient would prefer a phone call     Okay to leave a detailed message?: Yes at Cell number on file:    Telephone Information:   Mobile [phone]

## 2023-07-06 NOTE — TELEPHONE ENCOUNTER
Is she taking Hyzaar which is losartan with hydrochlorothiazide, or just Cozaar which is losartan?  When I met with her in February I thought we switched her from Hyzaar to Cozaar that is from losartan with hydrochlorothiazide to just losartan.

## 2023-09-05 ENCOUNTER — LAB (OUTPATIENT)
Dept: LAB | Facility: CLINIC | Age: 78
End: 2023-09-05
Payer: COMMERCIAL

## 2023-09-05 DIAGNOSIS — I10 ESSENTIAL HYPERTENSION: ICD-10-CM

## 2023-09-05 LAB
ANION GAP SERPL CALCULATED.3IONS-SCNC: 11 MMOL/L (ref 7–15)
BUN SERPL-MCNC: 16.5 MG/DL (ref 8–23)
CALCIUM SERPL-MCNC: 9.5 MG/DL (ref 8.8–10.2)
CHLORIDE SERPL-SCNC: 96 MMOL/L (ref 98–107)
CREAT SERPL-MCNC: 0.75 MG/DL (ref 0.51–0.95)
DEPRECATED HCO3 PLAS-SCNC: 26 MMOL/L (ref 22–29)
GFR SERPL CREATININE-BSD FRML MDRD: 82 ML/MIN/1.73M2
GLUCOSE SERPL-MCNC: 89 MG/DL (ref 70–99)
POTASSIUM SERPL-SCNC: 4.9 MMOL/L (ref 3.4–5.3)
SODIUM SERPL-SCNC: 133 MMOL/L (ref 136–145)

## 2023-09-05 PROCEDURE — 36415 COLL VENOUS BLD VENIPUNCTURE: CPT

## 2023-09-05 PROCEDURE — 80048 BASIC METABOLIC PNL TOTAL CA: CPT

## 2023-09-06 ENCOUNTER — DOCUMENTATION ONLY (OUTPATIENT)
Dept: INTERNAL MEDICINE | Facility: CLINIC | Age: 78
End: 2023-09-06
Payer: COMMERCIAL

## 2023-09-06 NOTE — PROGRESS NOTES
Patient is coming in next week for a lab only and has no orders in the chart. Possibly a repeat sodium/BMP?  Please advise/enter orders.  Thank you.

## 2023-09-07 ENCOUNTER — MYC MEDICAL ADVICE (OUTPATIENT)
Dept: INTERNAL MEDICINE | Facility: CLINIC | Age: 78
End: 2023-09-07
Payer: COMMERCIAL

## 2023-09-07 DIAGNOSIS — I10 ESSENTIAL HYPERTENSION: ICD-10-CM

## 2023-09-07 DIAGNOSIS — E78.00 HYPERCHOLESTEROLEMIA: Primary | ICD-10-CM

## 2023-09-12 ENCOUNTER — LAB (OUTPATIENT)
Dept: LAB | Facility: CLINIC | Age: 78
End: 2023-09-12
Payer: COMMERCIAL

## 2023-09-12 DIAGNOSIS — E78.00 HYPERCHOLESTEROLEMIA: ICD-10-CM

## 2023-09-12 DIAGNOSIS — I10 ESSENTIAL HYPERTENSION: ICD-10-CM

## 2023-09-12 LAB
ALBUMIN SERPL BCG-MCNC: 4.2 G/DL (ref 3.5–5.2)
ALBUMIN UR-MCNC: 30 MG/DL
ALP SERPL-CCNC: 78 U/L (ref 35–104)
ALT SERPL W P-5'-P-CCNC: 22 U/L (ref 0–50)
ANION GAP SERPL CALCULATED.3IONS-SCNC: 10 MMOL/L (ref 7–15)
APPEARANCE UR: CLEAR
AST SERPL W P-5'-P-CCNC: 38 U/L (ref 0–45)
BACTERIA #/AREA URNS HPF: ABNORMAL /HPF
BILIRUB SERPL-MCNC: 0.4 MG/DL
BILIRUB UR QL STRIP: NEGATIVE
BUN SERPL-MCNC: 12.1 MG/DL (ref 8–23)
CALCIUM SERPL-MCNC: 9.5 MG/DL (ref 8.8–10.2)
CHLORIDE SERPL-SCNC: 97 MMOL/L (ref 98–107)
CHOLEST SERPL-MCNC: 161 MG/DL
COLOR UR AUTO: YELLOW
CREAT SERPL-MCNC: 0.71 MG/DL (ref 0.51–0.95)
DEPRECATED HCO3 PLAS-SCNC: 23 MMOL/L (ref 22–29)
EGFRCR SERPLBLD CKD-EPI 2021: 87 ML/MIN/1.73M2
ERYTHROCYTE [DISTWIDTH] IN BLOOD BY AUTOMATED COUNT: 12.6 % (ref 10–15)
GLUCOSE SERPL-MCNC: 70 MG/DL (ref 70–99)
GLUCOSE UR STRIP-MCNC: NEGATIVE MG/DL
HCT VFR BLD AUTO: 34.6 % (ref 35–47)
HDLC SERPL-MCNC: 88 MG/DL
HGB BLD-MCNC: 11.4 G/DL (ref 11.7–15.7)
HGB UR QL STRIP: NEGATIVE
KETONES UR STRIP-MCNC: NEGATIVE MG/DL
LDLC SERPL CALC-MCNC: 62 MG/DL
LEUKOCYTE ESTERASE UR QL STRIP: NEGATIVE
MCH RBC QN AUTO: 32.9 PG (ref 26.5–33)
MCHC RBC AUTO-ENTMCNC: 32.9 G/DL (ref 31.5–36.5)
MCV RBC AUTO: 100 FL (ref 78–100)
MUCOUS THREADS #/AREA URNS LPF: PRESENT /LPF
NITRATE UR QL: NEGATIVE
NONHDLC SERPL-MCNC: 73 MG/DL
PH UR STRIP: 6 [PH] (ref 5–8)
PLATELET # BLD AUTO: 219 10E3/UL (ref 150–450)
POTASSIUM SERPL-SCNC: 4.7 MMOL/L (ref 3.4–5.3)
PROT SERPL-MCNC: 6.4 G/DL (ref 6.4–8.3)
RBC # BLD AUTO: 3.47 10E6/UL (ref 3.8–5.2)
RBC #/AREA URNS AUTO: ABNORMAL /HPF
SODIUM SERPL-SCNC: 130 MMOL/L (ref 136–145)
SP GR UR STRIP: 1.01 (ref 1–1.03)
SQUAMOUS #/AREA URNS AUTO: ABNORMAL /LPF
TRIGL SERPL-MCNC: 54 MG/DL
UROBILINOGEN UR STRIP-ACNC: 0.2 E.U./DL
WBC # BLD AUTO: 4.3 10E3/UL (ref 4–11)
WBC #/AREA URNS AUTO: ABNORMAL /HPF

## 2023-09-12 PROCEDURE — 81001 URINALYSIS AUTO W/SCOPE: CPT

## 2023-09-12 PROCEDURE — 80061 LIPID PANEL: CPT

## 2023-09-12 PROCEDURE — 85027 COMPLETE CBC AUTOMATED: CPT

## 2023-09-12 PROCEDURE — 36415 COLL VENOUS BLD VENIPUNCTURE: CPT

## 2023-09-12 PROCEDURE — 80053 COMPREHEN METABOLIC PANEL: CPT

## 2023-09-13 DIAGNOSIS — I10 ESSENTIAL HYPERTENSION: ICD-10-CM

## 2023-09-13 RX ORDER — AMLODIPINE BESYLATE 10 MG/1
10 TABLET ORAL DAILY
Qty: 90 TABLET | Refills: 1 | Status: SHIPPED | OUTPATIENT
Start: 2023-09-13 | End: 2023-12-29

## 2023-09-15 ENCOUNTER — TRANSFERRED RECORDS (OUTPATIENT)
Dept: HEALTH INFORMATION MANAGEMENT | Facility: CLINIC | Age: 78
End: 2023-09-15
Payer: COMMERCIAL

## 2023-11-07 ENCOUNTER — IMMUNIZATION (OUTPATIENT)
Dept: FAMILY MEDICINE | Facility: CLINIC | Age: 78
End: 2023-11-07
Payer: COMMERCIAL

## 2023-11-07 DIAGNOSIS — Z23 NEED FOR PROPHYLACTIC VACCINATION AND INOCULATION AGAINST INFLUENZA: Primary | ICD-10-CM

## 2023-11-07 DIAGNOSIS — Z23 HIGH PRIORITY FOR 2019-NCOV VACCINE: ICD-10-CM

## 2023-11-07 PROCEDURE — G0008 ADMIN INFLUENZA VIRUS VAC: HCPCS

## 2023-11-07 PROCEDURE — 90662 IIV NO PRSV INCREASED AG IM: CPT

## 2023-11-07 PROCEDURE — 90480 ADMN SARSCOV2 VAC 1/ONLY CMP: CPT

## 2023-11-07 PROCEDURE — 91320 SARSCV2 VAC 30MCG TRS-SUC IM: CPT

## 2023-11-07 PROCEDURE — 99207 PR NO CHARGE NURSE ONLY: CPT

## 2023-11-20 DIAGNOSIS — E78.00 PURE HYPERCHOLESTEROLEMIA: ICD-10-CM

## 2023-11-20 RX ORDER — ATORVASTATIN CALCIUM 80 MG/1
TABLET, FILM COATED ORAL
Qty: 60 TABLET | Refills: 0 | Status: SHIPPED | OUTPATIENT
Start: 2023-11-20 | End: 2023-12-29

## 2023-11-30 ENCOUNTER — OFFICE VISIT (OUTPATIENT)
Dept: INTERNAL MEDICINE | Facility: CLINIC | Age: 78
End: 2023-11-30
Payer: COMMERCIAL

## 2023-11-30 VITALS
HEART RATE: 94 BPM | DIASTOLIC BLOOD PRESSURE: 56 MMHG | WEIGHT: 132.5 LBS | OXYGEN SATURATION: 97 % | HEIGHT: 64 IN | SYSTOLIC BLOOD PRESSURE: 132 MMHG | RESPIRATION RATE: 30 BRPM | TEMPERATURE: 97.5 F | BODY MASS INDEX: 22.62 KG/M2

## 2023-11-30 DIAGNOSIS — B00.9 HERPES SIMPLEX: ICD-10-CM

## 2023-11-30 DIAGNOSIS — J30.1 NON-SEASONAL ALLERGIC RHINITIS DUE TO POLLEN: ICD-10-CM

## 2023-11-30 DIAGNOSIS — M54.41 CHRONIC BILATERAL LOW BACK PAIN WITH BILATERAL SCIATICA: ICD-10-CM

## 2023-11-30 DIAGNOSIS — I65.23 BILATERAL CAROTID ARTERY STENOSIS: ICD-10-CM

## 2023-11-30 DIAGNOSIS — Z86.0100 HISTORY OF COLONIC POLYPS: ICD-10-CM

## 2023-11-30 DIAGNOSIS — I10 ESSENTIAL HYPERTENSION: ICD-10-CM

## 2023-11-30 DIAGNOSIS — Z00.00 ANNUAL PHYSICAL EXAM: Primary | ICD-10-CM

## 2023-11-30 DIAGNOSIS — G89.29 CHRONIC BILATERAL LOW BACK PAIN WITH BILATERAL SCIATICA: ICD-10-CM

## 2023-11-30 DIAGNOSIS — Z12.11 ENCOUNTER FOR SCREENING COLONOSCOPY: ICD-10-CM

## 2023-11-30 DIAGNOSIS — M54.42 CHRONIC BILATERAL LOW BACK PAIN WITH BILATERAL SCIATICA: ICD-10-CM

## 2023-11-30 DIAGNOSIS — E53.8 VITAMIN B12 DEFICIENCY: ICD-10-CM

## 2023-11-30 DIAGNOSIS — D53.9 MACROCYTIC ANEMIA: ICD-10-CM

## 2023-11-30 DIAGNOSIS — E78.00 HYPERCHOLESTEROLEMIA: ICD-10-CM

## 2023-11-30 LAB
BASOPHILS # BLD AUTO: 0 10E3/UL (ref 0–0.2)
BASOPHILS NFR BLD AUTO: 1 %
EOSINOPHIL # BLD AUTO: 0.4 10E3/UL (ref 0–0.7)
EOSINOPHIL NFR BLD AUTO: 10 %
ERYTHROCYTE [DISTWIDTH] IN BLOOD BY AUTOMATED COUNT: 13.5 % (ref 10–15)
FERRITIN SERPL-MCNC: 99 NG/ML (ref 11–328)
FOLATE SERPL-MCNC: 5.5 NG/ML (ref 4.6–34.8)
HAPTOGLOB SERPL-MCNC: 110 MG/DL (ref 30–200)
HCT VFR BLD AUTO: 35.4 % (ref 35–47)
HGB BLD-MCNC: 11.4 G/DL (ref 11.7–15.7)
IMM GRANULOCYTES # BLD: 0 10E3/UL
IMM GRANULOCYTES NFR BLD: 0 %
LYMPHOCYTES # BLD AUTO: 1.1 10E3/UL (ref 0.8–5.3)
LYMPHOCYTES NFR BLD AUTO: 25 %
MCH RBC QN AUTO: 33 PG (ref 26.5–33)
MCHC RBC AUTO-ENTMCNC: 32.2 G/DL (ref 31.5–36.5)
MCV RBC AUTO: 103 FL (ref 78–100)
MONOCYTES # BLD AUTO: 0.7 10E3/UL (ref 0–1.3)
MONOCYTES NFR BLD AUTO: 17 %
NEUTROPHILS # BLD AUTO: 2.1 10E3/UL (ref 1.6–8.3)
NEUTROPHILS NFR BLD AUTO: 48 %
PLATELET # BLD AUTO: 256 10E3/UL (ref 150–450)
RBC # BLD AUTO: 3.45 10E6/UL (ref 3.8–5.2)
RETICS # AUTO: 0.04 10E6/UL (ref 0.01–0.11)
RETICS/RBC NFR AUTO: 1.1 % (ref 0.8–2.7)
TSH SERPL DL<=0.005 MIU/L-ACNC: 1.3 UIU/ML (ref 0.3–4.2)
VIT B12 SERPL-MCNC: 244 PG/ML (ref 232–1245)
WBC # BLD AUTO: 4.3 10E3/UL (ref 4–11)

## 2023-11-30 PROCEDURE — 83921 ORGANIC ACID SINGLE QUANT: CPT | Performed by: INTERNAL MEDICINE

## 2023-11-30 PROCEDURE — 82746 ASSAY OF FOLIC ACID SERUM: CPT | Performed by: INTERNAL MEDICINE

## 2023-11-30 PROCEDURE — 83010 ASSAY OF HAPTOGLOBIN QUANT: CPT | Performed by: INTERNAL MEDICINE

## 2023-11-30 PROCEDURE — 85025 COMPLETE CBC W/AUTO DIFF WBC: CPT | Performed by: INTERNAL MEDICINE

## 2023-11-30 PROCEDURE — G0439 PPPS, SUBSEQ VISIT: HCPCS | Performed by: INTERNAL MEDICINE

## 2023-11-30 PROCEDURE — 82728 ASSAY OF FERRITIN: CPT | Performed by: INTERNAL MEDICINE

## 2023-11-30 PROCEDURE — 82607 VITAMIN B-12: CPT | Performed by: INTERNAL MEDICINE

## 2023-11-30 PROCEDURE — 36415 COLL VENOUS BLD VENIPUNCTURE: CPT | Performed by: INTERNAL MEDICINE

## 2023-11-30 PROCEDURE — 84443 ASSAY THYROID STIM HORMONE: CPT | Performed by: INTERNAL MEDICINE

## 2023-11-30 PROCEDURE — 99214 OFFICE O/P EST MOD 30 MIN: CPT | Mod: 25 | Performed by: INTERNAL MEDICINE

## 2023-11-30 PROCEDURE — 85045 AUTOMATED RETICULOCYTE COUNT: CPT | Performed by: INTERNAL MEDICINE

## 2023-11-30 RX ORDER — VALACYCLOVIR HYDROCHLORIDE 1 G/1
500 TABLET, FILM COATED ORAL DAILY
Start: 2023-11-30 | End: 2024-06-03

## 2023-11-30 NOTE — PROGRESS NOTES
SUBJECTIVE:   Cathy is a 78 year old, presenting for the following:  Wellness Visit        11/30/2023     9:10 AM   Additional Questions   Roomed by Phuong NANCE CMA       Are you in the first 12 months of your Medicare coverage?  No    History of Present Illness       Reason for visit:  Follow up re anemia  Symptom onset:  More than a month    She eats 2-3 servings of fruits and vegetables daily.She consumes 5 sweetened beverage(s) daily.She exercises with enough effort to increase her heart rate 30 to 60 minutes per day.  She exercises with enough effort to increase her heart rate 5 days per week.   She is taking medications regularly.      Today's PHQ-2 Score:       11/30/2023     9:09 AM   PHQ-2 ( 1999 [employer])   Q1: Little interest or pleasure in doing things 0   Q2: Feeling down, depressed or hopeless 0   PHQ-2 Score 0   Q1: Little interest or pleasure in doing things Not at all   Q2: Feeling down, depressed or hopeless Not at all   PHQ-2 Score 0           Have you ever done Advance Care Planning? (For example, a Health Directive, POLST, or a discussion with a medical provider or your loved ones about your wishes): No, advance care planning information given to patient to review.  Patient plans to discuss their wishes with loved ones or provider.         Fall risk  Fallen 2 or more times in the past year?: No  Any fall with injury in the past year?: No  click delete button to remove this line now  Cognitive Screening   1) Repeat 3 items (Leader, Season, Table)    2) Clock draw: ABNORMAL did not number the clock   3) 3 item recall: Recalls 3 objects  Results: ABNORMAL clock, 3 items recalled:     Mini-CogTM Copyright S Axel. Licensed by the author for use in [facility]; reprinted with permission (armin@[facility]). All rights reserved.        Reviewed and updated as needed this visit by clinical staff   Tobacco  Allergies               Reviewed and updated as needed this visit by Provider                "  Social History     Tobacco Use    Smoking status: Never    Smokeless tobacco: Never   Substance Use Topics    Alcohol use: Yes     Comment: glass of wine daily              4/4/2022    12:09 PM   Alcohol Use   Prescreen: >3 drinks/day or >7 drinks/week? No          No data to display              Do you have a current opioid prescription? No  Do you use any other controlled substances or medications that are not prescribed by a provider? None    Current providers sharing in care for this patient include:   Patient Care Team:  Zeb Damon MD as PCP - General (Internal Medicine)  Zeb Damon MD as Assigned PCP    The following health maintenance items are reviewed in Epic and correct as of today:  Health Maintenance   Topic Date Due    MEDICARE ANNUAL WELLNESS VISIT  04/04/2023    ANNUAL REVIEW OF HM ORDERS  05/18/2023    FALL RISK ASSESSMENT  11/30/2024    ADVANCE CARE PLANNING  04/04/2027    LIPID  09/12/2028    DTAP/TDAP/TD IMMUNIZATION (3 - Td or Tdap) 03/12/2029    DEXA  04/17/2032    HEPATITIS C SCREENING  Completed    PHQ-2 (once per calendar year)  Completed    INFLUENZA VACCINE  Completed    Pneumococcal Vaccine: 65+ Years  Completed    ZOSTER IMMUNIZATION  Completed    RSV VACCINE (Pregnancy & 60+)  Completed    COVID-19 Vaccine  Completed    IPV IMMUNIZATION  Aged Out    HPV IMMUNIZATION  Aged Out    MENINGITIS IMMUNIZATION  Aged Out    RSV MONOCLONAL ANTIBODY  Aged Out    MAMMO SCREENING  Discontinued    COLORECTAL CANCER SCREENING  Discontinued         Pertinent mammograms are reviewed under the imaging tab.    Review of Systems  Constitutional, HEENT, cardiovascular, pulmonary, GI, , musculoskeletal, neuro, skin, endocrine and psych systems are negative, except as otherwise noted.    OBJECTIVE:   /56 (BP Location: Left arm, Patient Position: Sitting, Cuff Size: Adult Regular)   Pulse 94   Temp 97.5  F (36.4  C) (Tympanic)   Resp 30   Ht 1.619 m (5' 3.75\")   Wt 60.1 kg (132 lb 8 " "oz)   LMP  (LMP Unknown)   SpO2 97%   Breastfeeding No   BMI 22.92 kg/m   Estimated body mass index is 22.92 kg/m  as calculated from the following:    Height as of this encounter: 1.619 m (5' 3.75\").    Weight as of this encounter: 60.1 kg (132 lb 8 oz).  Physical Exam  EYES: Eyelids, conjunctiva, and sclera were normal. Pupils were normal. Cornea, iris, and lens were normal bilaterally.  HEAD, EARS, NOSE, MOUTH, AND THROAT: Head and face were normal. Hearing was normal to voice and the ears were normal to external exam. Nose appearance was normal and there was no discharge.   NECK: Neck appearance was normal.   RESPIRATORY: Breathing pattern was normal and the chest moved symmetrically.  Percussion/auscultatory percussion was normal.  Lung sounds were normal and there were no abnormal sounds.  CARDIOVASCULAR: Heart rate and rhythm were normal.  S1 and S2 were normal and there were no extra sounds or murmurs. Peripheral pulses in arms and legs were normal.  Jugular venous pressure was normal.  There was no peripheral edema.  GASTROINTESTINAL: The abdomen was normal in contour.   NEUROLOGIC: The patient was alert and oriented to person, place, time, and circumstance. Speech was normal. Cranial nerves were normal. Motor strength was normal for age. The patient was normally coordinated.  PSYCHIATRIC:  Mood and affect were normal and the patient had normal recent and remote memory. The patient's judgment and insight were normal.    ASSESSMENT / PLAN:   1. Annual physical exam  This is a 78-year-old woman with issues as discussed below    2. Hypertension  Well-controlled continue same    3. Hypercholesterolemia  Continue statin    4. Bilateral carotid artery stenosis  Continue secondary prevention    5. Chronic bilateral low back pain with bilateral sciatica  Stable working with [facility]    6. Non-seasonal allergic rhinitis due to pollen  Stable    7. Herpes simplex  - valACYclovir (VALTREX) 1000 mg tablet; " Take 0.5 tablets (500 mg) by mouth daily    8. Macrocytic anemia  Concerning for vitamin B12 deficiency especially with her slightly abnormal clock.  That said her clot was essentially normal except she had a short and long and reverse and she states she really does not look at clots at all.  However, it is a bit unusual that she would not be able to draw this correctly coupled with her macrocytic anemia is highly suggestive of vitamin B12 deficiency  - Vitamin B12; Future  - Ferritin; Future  - Haptoglobin; Future  - Folate; Future  - TSH; Future  - CBC with platelets and differential; Future  - Reticulocyte count; Future  - Methylmalonic Acid; Future  - Vitamin B12  - Ferritin  - Haptoglobin  - Folate  - TSH  - CBC with platelets and differential  - Reticulocyte count  - Methylmalonic Acid    9. Encounter for screening colonoscopy  - Colonoscopy Screening  Referral; Future    10. History of colonic polyps  - Colonoscopy Screening  Referral; Future    COUNSELING:  Reviewed preventive health counseling, as reflected in patient instructions    She reports that she has never smoked. She has never used smokeless tobacco.    Appropriate preventive services were discussed with this patient, including applicable screening as appropriate for fall prevention, nutrition, physical activity, Tobacco-use cessation, weight loss and cognition.  Checklist reviewing preventive services available has been given to the patient.    Reviewed patients plan of care and provided an AVS. The Basic Care Plan (routine screening as documented in Health Maintenance) for Cathy meets the Care Plan requirement. This Care Plan has been established and reviewed with the Patient.          Zeb Damon MD  [facility]    Identified Health Risks:  I have reviewed Opioid Use Disorder and Substance Use Disorder risk factors and made any needed referrals.

## 2023-12-01 RX ORDER — LANOLIN ALCOHOL/MO/W.PET/CERES
1000 CREAM (GRAM) TOPICAL DAILY
Qty: 90 TABLET | Refills: 4 | Status: SHIPPED | OUTPATIENT
Start: 2023-12-01

## 2023-12-05 LAB — METHYLMALONATE SERPL-SCNC: 0.52 UMOL/L (ref 0–0.4)

## 2023-12-29 DIAGNOSIS — I10 ESSENTIAL HYPERTENSION: ICD-10-CM

## 2023-12-29 DIAGNOSIS — E78.00 PURE HYPERCHOLESTEROLEMIA: ICD-10-CM

## 2023-12-29 RX ORDER — LOSARTAN POTASSIUM 100 MG/1
100 TABLET ORAL DAILY
Qty: 90 TABLET | Refills: 4 | Status: SHIPPED | OUTPATIENT
Start: 2023-12-29

## 2023-12-29 RX ORDER — ATORVASTATIN CALCIUM 80 MG/1
TABLET, FILM COATED ORAL
Qty: 90 TABLET | Refills: 3 | Status: SHIPPED | OUTPATIENT
Start: 2023-12-29

## 2023-12-29 RX ORDER — AMLODIPINE BESYLATE 10 MG/1
10 TABLET ORAL DAILY
Qty: 90 TABLET | Refills: 1 | Status: SHIPPED | OUTPATIENT
Start: 2023-12-29 | End: 2024-08-26

## 2023-12-29 NOTE — TELEPHONE ENCOUNTER
amlodipine  Last Written Prescription Date:  9/13/2023  Last Fill Quantity: 90,  # refills: 1   Last office visit provider:  11/30/2023     losartan  Last Written Prescription Date:  2/13/2023  Last Fill Quantity: 90,  # refills: 4   Last office visit provider:  11/30/2023     Requested Prescriptions   Pending Prescriptions Disp Refills    amLODIPine (NORVASC) 10 MG tablet 90 tablet 1     Sig: Take 1 tablet (10 mg) by mouth daily       Calcium Channel Blockers Protocol  Passed - 12/29/2023 11:14 AM        Passed - Blood pressure under 140/90 in past 12 months     BP Readings from Last 3 Encounters:   11/30/23 132/56   02/13/23 132/62   05/18/22 130/58                 Passed - Recent (12 mo) or future (30 days) visit within the authorizing provider's specialty     The patient must have completed an in-person or virtual visit within the past 12 months or has a future visit scheduled within the next 90 days with the authorizing provider s specialty.  Urgent care and e-visits do not quality as an office visit for this protocol.          Passed - Medication is active on med list        Passed - Patient is age 18 or older        Passed - No active pregnancy on record        Passed - Normal serum creatinine on file in past 12 months     Recent Labs   Lab Test 09/12/23  0852   CR 0.71       Ok to refill medication if creatinine is low          Passed - No positive pregnancy test in past 12 months          losartan (COZAAR) 100 MG tablet 90 tablet 4     Sig: Take 1 tablet (100 mg) by mouth daily       Angiotensin-II Receptors Passed - 12/29/2023 11:14 AM        Passed - Last blood pressure under 140/90 in past 12 months     BP Readings from Last 3 Encounters:   11/30/23 132/56   02/13/23 132/62   05/18/22 130/58                 Passed - Recent (12 mo) or future (30 days) visit within the authorizing provider's specialty     The patient must have completed an in-person or virtual visit within the past 12 months or has a  future visit scheduled within the next 90 days with the authorizing provider s specialty.  Urgent care and e-visits do not quality as an office visit for this protocol.          Passed - Medication is active on med list        Passed - Patient is age 18 or older        Passed - No active pregnancy on record        Passed - Normal serum creatinine on file in past 12 months     Recent Labs   Lab Test 09/12/23  0852   CR 0.71       Ok to refill medication if creatinine is low          Passed - Normal serum potassium on file in past 12 months     Recent Labs   Lab Test 09/12/23  0852   POTASSIUM 4.7                    Passed - No positive pregnancy test in past 12 months             Ramírez Lay RN 12/29/23 12:55 PM

## 2023-12-29 NOTE — TELEPHONE ENCOUNTER
Reason for Call:  Medication refill:    Do you use a [facility]? [place] of the pharmacy and phone number for the current request:    [id number] - [place], [place] - [address]       Name of the medication requested: atorvastatin (LIPITOR) 80 MG tablet     Other request: Patient shares she is about to travel and needs new Prescription sent to pharmacy as she will run out during trip.  Requesting 90 day supply with refills.    Last Visit with PCP = 11/30/23    Medication Yossi'd up for approval if appropriate.     Call taken on 12/29/2023 at 1:50 PM by Jacey Sims

## 2024-01-22 ENCOUNTER — TELEPHONE (OUTPATIENT)
Dept: INTERNAL MEDICINE | Facility: CLINIC | Age: 79
End: 2024-01-22
Payer: COMMERCIAL

## 2024-01-22 DIAGNOSIS — A09 TRAVELER'S DIARRHEA: Primary | ICD-10-CM

## 2024-01-24 RX ORDER — DIPHENOXYLATE HCL/ATROPINE 2.5-.025MG
1 TABLET ORAL 4 TIMES DAILY PRN
Qty: 20 TABLET | Refills: 0 | Status: SHIPPED | OUTPATIENT
Start: 2024-01-24 | End: 2024-08-22

## 2024-01-24 RX ORDER — AZITHROMYCIN 500 MG/1
500 TABLET, FILM COATED ORAL DAILY
Qty: 3 TABLET | Refills: 0 | Status: SHIPPED | OUTPATIENT
Start: 2024-01-24 | End: 2024-01-27

## 2024-01-24 NOTE — TELEPHONE ENCOUNTER
Spoke with patient and relayed information below from Dr Vegas. Patient verbalized understanding and thanks Dr Vegas for his recommendations. She has no further questions.

## 2024-01-24 NOTE — CONFIDENTIAL NOTE
Current recommendations suggest minimizing antibiotics for traveler's diarrhea    If antibiotics are used, current first choice is azithromycin 500 mg daily for 3 days    Also recommend Lomotil to use as needed for diarrhea that is mild to moderate    Antibiotics should be added if diarrhea is severe    Prescription sent

## 2024-03-12 ENCOUNTER — LAB (OUTPATIENT)
Dept: LAB | Facility: CLINIC | Age: 79
End: 2024-03-12
Payer: COMMERCIAL

## 2024-03-12 DIAGNOSIS — E53.8 VITAMIN B12 DEFICIENCY: ICD-10-CM

## 2024-03-12 PROCEDURE — 82607 VITAMIN B-12: CPT

## 2024-03-12 PROCEDURE — 36415 COLL VENOUS BLD VENIPUNCTURE: CPT

## 2024-03-13 LAB — VIT B12 SERPL-MCNC: 1008 PG/ML (ref 232–1245)

## 2024-04-04 ENCOUNTER — TRANSFERRED RECORDS (OUTPATIENT)
Dept: HEALTH INFORMATION MANAGEMENT | Facility: CLINIC | Age: 79
End: 2024-04-04
Payer: COMMERCIAL

## 2024-04-16 ENCOUNTER — IMMUNIZATION (OUTPATIENT)
Dept: PEDIATRICS | Facility: CLINIC | Age: 79
End: 2024-04-16
Payer: COMMERCIAL

## 2024-04-16 DIAGNOSIS — Z23 HIGH PRIORITY FOR 2019-NCOV VACCINE: Primary | ICD-10-CM

## 2024-04-16 PROCEDURE — 91320 SARSCV2 VAC 30MCG TRS-SUC IM: CPT

## 2024-04-16 PROCEDURE — 99207 PR NO CHARGE NURSE ONLY: CPT

## 2024-04-16 PROCEDURE — 90480 ADMN SARSCOV2 VAC 1/ONLY CMP: CPT

## 2024-04-16 NOTE — PROGRESS NOTES
Prior to immunization administration, verified patients identity using patient s name and date of birth. Please see Immunization Activity for additional information.     Screening Questionnaire for Adult Immunization    Are you sick today?   No   Do you have allergies to medications, food, a vaccine component or latex?   No   Have you ever had a serious reaction after receiving a vaccination?   No   Do you have a long-term health problem with heart, lung, kidney, or metabolic disease (e.g., diabetes), asthma, a blood disorder, no spleen, complement component deficiency, a cochlear implant, or a spinal fluid leak?  Are you on long-term aspirin therapy?   No   Do you have cancer, leukemia, HIV/AIDS, or any other immune system problem?   No   Do you have a parent, brother, or sister with an immune system problem?   No   In the past 3 months, have you taken medications that affect  your immune system, such as prednisone, other steroids, or anticancer drugs; drugs for the treatment of rheumatoid arthritis, Crohn s disease, or psoriasis; or have you had radiation treatments?   No   Have you had a seizure, or a brain or other nervous system problem?   No   During the past year, have you received a transfusion of blood or blood    products, or been given immune (gamma) globulin or antiviral drug?   No   For women: Are you pregnant or is there a chance you could become       pregnant during the next month?   No   Have you received any vaccinations in the past 4 weeks?   No     Immunization questionnaire answers were all negative.    I have reviewed the following standing orders:   This patient is due and qualifies for the Covid-19 vaccine.     Click here for COVID-19 Standing Order    I have reviewed the vaccines inclusion and exclusion criteria; No concerns regarding eligibility.     Patient instructed to remain in clinic for 15 minutes afterwards, and to report any adverse reactions.     Screening performed by [facility]  MA on 4/16/2024 at 10:11 AM.

## 2024-04-23 ENCOUNTER — TRANSFERRED RECORDS (OUTPATIENT)
Dept: HEALTH INFORMATION MANAGEMENT | Facility: CLINIC | Age: 79
End: 2024-04-23
Payer: COMMERCIAL

## 2024-04-26 ENCOUNTER — TRANSFERRED RECORDS (OUTPATIENT)
Dept: HEALTH INFORMATION MANAGEMENT | Facility: CLINIC | Age: 79
End: 2024-04-26
Payer: COMMERCIAL

## 2024-06-03 DIAGNOSIS — B00.9 HERPES SIMPLEX: ICD-10-CM

## 2024-06-03 RX ORDER — VALACYCLOVIR HYDROCHLORIDE 1 G/1
500 TABLET, FILM COATED ORAL DAILY
Qty: 45 TABLET | Refills: 3 | Status: SHIPPED | OUTPATIENT
Start: 2024-06-03 | End: 2025-06-03

## 2024-07-19 DIAGNOSIS — M54.16 RADICULOPATHY, LUMBAR REGION: ICD-10-CM

## 2024-07-19 RX ORDER — MELOXICAM 15 MG/1
15 TABLET ORAL
Qty: 90 TABLET | Refills: 4 | Status: SHIPPED | OUTPATIENT
Start: 2024-07-19

## 2024-08-09 ENCOUNTER — TELEPHONE (OUTPATIENT)
Dept: INTERNAL MEDICINE | Facility: CLINIC | Age: 79
End: 2024-08-09

## 2024-08-09 NOTE — TELEPHONE ENCOUNTER
Patient returning a missed call about scheduling a wellness appt. Writer does not see a recent encounter for this. Patient has a scheduled appt with PCP for 12/2/24.     Patient unsure if patient was to be seen earlier than her scheduled appt. Patient states she will be going out of country to [place] in October for two weeks. Patient has appt to see travel clinic.     Writer relayed unsure but can get message to PCP care team and see if they may know more regarding this. Patient verbalized understanding and thanked for call.

## 2024-08-22 ENCOUNTER — OFFICE VISIT (OUTPATIENT)
Dept: FAMILY MEDICINE | Facility: CLINIC | Age: 79
End: 2024-08-22
Payer: COMMERCIAL

## 2024-08-22 VITALS
TEMPERATURE: 97.5 F | HEART RATE: 98 BPM | BODY MASS INDEX: 22.52 KG/M2 | SYSTOLIC BLOOD PRESSURE: 134 MMHG | DIASTOLIC BLOOD PRESSURE: 83 MMHG | WEIGHT: 131.9 LBS | OXYGEN SATURATION: 99 % | HEIGHT: 64 IN | RESPIRATION RATE: 16 BRPM

## 2024-08-22 DIAGNOSIS — Z71.84 TRAVEL ADVICE ENCOUNTER: Primary | ICD-10-CM

## 2024-08-22 PROCEDURE — 99402 PREV MED CNSL INDIV APPRX 30: CPT | Mod: 25 | Performed by: NURSE PRACTITIONER

## 2024-08-22 PROCEDURE — 90715 TDAP VACCINE 7 YRS/> IM: CPT | Mod: GA | Performed by: NURSE PRACTITIONER

## 2024-08-22 PROCEDURE — 90471 IMMUNIZATION ADMIN: CPT | Mod: GA | Performed by: NURSE PRACTITIONER

## 2024-08-22 ASSESSMENT — PAIN SCALES - GENERAL: PAINLEVEL: NO PAIN (0)

## 2024-08-22 NOTE — PATIENT INSTRUCTIONS
Thank you for visiting the [facility] : [phone]  Today August 22, 2024 you received the    Tetanus (Tdap) Vaccine    At Pharmacy : Covid update,  consider Hep A , Flu shot 2 weeks before travel ,     Follow up vaccine appointments can be made as a NURSE ONLY visit at the Travel Clinic, (BE PREPARED TO WAIT, ) or at designated [facility].    If you are receiving the Rabies vaccines series, it is important that you follow the exact schedule ordered.     Pre-travel     We recommend that you purchase Medical Evacuation Insurance prior to your departure.  Https://wwwnc.cdc.gov/travel/page/insurance    [place] your travel plans with the  Department of State through STEP ( Smart Traveler Enrollment Program ) https://step.state.gov.  STEP is a free service to allow U[place] citizens and nationals traveling and living abroad to enroll their trip with the nearest U.S. Embassy or Consulate.    Animal Exposure: Avoid all mammals even if they look healthy.  If there is a bite, scratch or even a lick, wash area immediately with soap and water for 15 minutes and seek medical care within 24 hours for evaluation of Rabies post exposure treatment.  Contact your Medical Evacuation Insurance.    Repiratory illness prevention strategies ( Covid and Influenza ) CDC recommendations:  Consider wearing a mask in crowded or poorly ventilated indoor areas, including on public transportation and in transportation hubs.  Hand washing: frequent, thorough handwashing with soap and water for 20 seconds. Use an alcohol-based hand  with at least 60% alcohol if soap and water are not readily available. Avoid touching face, nose, eyes, mouth unless you have done appropriate hand washing as above.  VACCINES: Staying up to date on COVID-19 vaccines significantly lowers the risk of getting very sick, being hospitalized, or dying from COVID-19. CDC recommends that everyone stay up to date on  their COVID-19 vaccines, especially people with weakened immune systems.    Travel Covid 19 Testing:  updated 12/06/2021  International travelers: Pre-travel:  See country specific Embassy websites or airline websites.    Post travel: CDC recommends getting tested 3-5 days after your trip     Post-travel illness:  Contact your provider or [facility] if you develop a fever, rash, cough, diarrhea or other symptoms for up to 1 year after travel.  Inform your healthcare provider when and where you traveled to.    Please call the [facility] with any questions [phone]  Or send your provider a 'My Chart' note.

## 2024-08-22 NOTE — PROGRESS NOTES
Nurse Note ( Pre-Travel Consult)    Itinerary:  [place]     Departure Date: 10/12/2024     Return Date: 10/26/2024     Length of Trip 2 weeks     Reason for Travel: Tourism         Urban or rural: both    Accommodations: Hotel        IMMUNIZATION HISTORY  Have you received any immunizations within the past 4 weeks?  No  Have you ever fainted from having your blood drawn or from an injection?  No  Have you ever had a fever reaction to vaccination?  No  Have you ever had any bad reaction or side effect from any vaccination?  No  Have you ever had hepatitis A or B vaccine?  No  Do you live (or work closely) with anyone who has AIDS, an AIDS-like condition, any other immune disorder or who is on chemotherapy for cancer?  No  Do you have a family history of immunodeficiency?  No  Have you received any injection of immune globulin or any blood products during the past 12 months?  No    Patient roomed by Darshana Smalls  Cathy Beckwith is a 79 year old female seen today alone for counsultation for international travel.   Patient will be departing in  6 week(s) and  traveling with family member(s).      Patient itinerary :  will be in the 1 week of cruise up to the [place] of [facility] to [facility] then 1 week of sightseeing ([facility] ) and other areas of [place] . Current outbreak of Pertussis in [place]    Patient's activities will include sightseeing and cruise.    Patient's country of birth is [place]    Special medical concerns: controlled hypertension  Pre-travel questionnaire was completed by patient and reviewed by provider.     Vitals: LMP  (LMP Unknown)   BMI= There is no height or weight on file to calculate BMI.    EXAM:  General:  Well-nourished, well-developed in no acute distress.  Appears to be stated age, interacts appropriately and expresses understanding of information given to patient.    Current Outpatient Medications   Medication Sig Dispense Refill    amLODIPine (NORVASC) 10 MG tablet Take 1 tablet (10  mg) by mouth daily 90 tablet 1    aspirin 81 MG EC tablet [ASPIRIN 81 MG EC TABLET] Take 81 mg by mouth daily.      atorvastatin (LIPITOR) 80 MG tablet TAKE 1 TABLET BY MOUTH EVERYDAY AT BEDTIME 90 tablet 3    cetirizine (ZYRTEC) 10 MG tablet Take 10 mg by mouth daily      cholecalciferol, vitamin D3, 1,000 unit tablet [CHOLECALCIFEROL, VITAMIN D3, 1,000 UNIT TABLET] Take 2,000 Units by mouth every evening.      cyanocobalamin (VITAMIN B-12) 1000 MCG tablet Take 1 tablet (1,000 mcg) by mouth daily 90 tablet 4    diphenoxylate-atropine (LOMOTIL) 2.5-0.025 MG tablet Take 1 tablet by mouth 4 times daily as needed for diarrhea 20 tablet 0    docusate sodium (COLACE) 100 MG capsule [DOCUSATE SODIUM (COLACE) 100 MG CAPSULE] Take 1 capsule (100 mg total) by mouth 2 (two) times a day as needed for constipation.  0    fluorouracil (EFUDEX) 5 % external cream PLEASE SEE ATTACHED FOR DETAILED DIRECTIONS      gabapentin (NEURONTIN) 100 MG capsule Take by mouth 3 times daily      hydrocortisone (ANUSOL-HC) 2.5 % rectal cream [HYDROCORTISONE (ANUSOL-HC) 2.5 % RECTAL CREAM] USE AS DIRECTED DAILY AS NEEDED 30 g 3    losartan (COZAAR) 100 MG tablet Take 1 tablet (100 mg) by mouth daily 90 tablet 4    meloxicam (MOBIC) 15 MG tablet TAKE 1 TABLET BY MOUTH EVERY DAY WITH FOOD 90 tablet 4    triamcinolone acetonide (NASACORT AQ NASL)       valACYclovir (VALTREX) 1000 mg tablet Take 0.5 tablets (500 mg) by mouth daily 45 tablet 3     Patient Active Problem List   Diagnosis    Chronic bilateral low back pain with bilateral sciatica    Hypercholesterolemia    Hypertension    Non-seasonal allergic rhinitis due to pollen    Bilateral carotid artery stenosis     Allergies   Allergen Reactions    Ace Inhibitors Unknown     Didn't feel well    Codeine Nausea         Immunizations discussed include:   Covid 19: vaccine is not available  Hepatitis A:  Get at pharmacy  Hepatitis B: Not indicated  Influenza:  Get at pharmacy  Typhoid: Not  indicated  Rabies: Not indicated  Yellow Fever: Not indicated  Japanese Encephalitis: Not indicated  Meningococcus: Not indicated  Tetanus/Diphtheria: Ordered/given today, risks, benefits and side effects reviewed  Measles/Mumps/Rubella: Immune by disease history per patient report  Cholera: Not needed  Polio: Not indicated  Pneumococcal: Up to date  Varicella: Immune by disease history per patient report  Shingrix: Up to date  HPV:  Not indicated     TB: low risk    Altitude Exposure on this trip: no  Past tolerance to Altitude: na    ASSESSMENT/PLAN:  There are no diagnoses linked to this encounter.  I have reviewed general recommendations for safe travel   including: food/water precautions, insect precautions, safer sex   practices given high prevalence of Zika, HIV and other STDs,   roadway safety. Educational materials and [employer] report provided.    Malaraia prophylaxis recommended: none  Symptomatic treatment for traveler's diarrhea: none        Evacuation insurance advised and resources were provided to patient.    Total visit time 32 minutes  with over 50% of time spent counseling patient and shared decision making as detailed above.    Jasmine Dunham CNP  Certificate in Travel Health

## 2024-08-25 DIAGNOSIS — I10 ESSENTIAL HYPERTENSION: ICD-10-CM

## 2024-08-26 RX ORDER — AMLODIPINE BESYLATE 10 MG/1
10 TABLET ORAL DAILY
Qty: 90 TABLET | Refills: 0 | Status: SHIPPED | OUTPATIENT
Start: 2024-08-26

## 2024-09-03 ENCOUNTER — IMMUNIZATION (OUTPATIENT)
Dept: PEDIATRICS | Facility: CLINIC | Age: 79
End: 2024-09-03
Payer: COMMERCIAL

## 2024-09-03 DIAGNOSIS — Z23 NEED FOR PROPHYLACTIC VACCINATION AND INOCULATION AGAINST INFLUENZA: Primary | ICD-10-CM

## 2024-09-03 PROCEDURE — 90662 IIV NO PRSV INCREASED AG IM: CPT

## 2024-09-03 PROCEDURE — G0008 ADMIN INFLUENZA VIRUS VAC: HCPCS

## 2024-09-03 PROCEDURE — 99207 PR NO CHARGE NURSE ONLY: CPT

## 2024-09-03 NOTE — PROGRESS NOTES
Prior to immunization administration, verified patients identity using patient s name and date of birth. Please see Immunization Activity for additional information.     Screening Questionnaire for Adult Immunization    Are you sick today?   No   Do you have allergies to medications, food, a vaccine component or latex?   No   Have you ever had a serious reaction after receiving a vaccination?   No   Do you have a long-term health problem with heart, lung, kidney, or metabolic disease (e.g., diabetes), asthma, a blood disorder, no spleen, complement component deficiency, a cochlear implant, or a spinal fluid leak?  Are you on long-term aspirin therapy?   No   Do you have cancer, leukemia, HIV/AIDS, or any other immune system problem?   No   Do you have a parent, brother, or sister with an immune system problem?   No   In the past 3 months, have you taken medications that affect  your immune system, such as prednisone, other steroids, or anticancer drugs; drugs for the treatment of rheumatoid arthritis, Crohn s disease, or psoriasis; or have you had radiation treatments?   No   Have you had a seizure, or a brain or other nervous system problem?   No   During the past year, have you received a transfusion of blood or blood    products, or been given immune (gamma) globulin or antiviral drug?   No   For women: Are you pregnant or is there a chance you could become       pregnant during the next month?   No   Have you received any vaccinations in the past 4 weeks?   No     Immunization questionnaire answers were all negative.    I have reviewed the following standing orders:   This patient is due and qualifies for the Influenza vaccine.    Click here for Influenza Vaccine Standing Order    I have reviewed the vaccines inclusion and exclusion criteria; No concerns regarding eligibility.     Patient instructed to remain in clinic for 15 minutes afterwards, and to report any adverse reactions.     Screening performed by  Palma House MA on 9/3/2024 at 4:15 PM.

## 2024-10-04 DIAGNOSIS — E78.00 PURE HYPERCHOLESTEROLEMIA: ICD-10-CM

## 2024-10-04 RX ORDER — ATORVASTATIN CALCIUM 80 MG/1
TABLET, FILM COATED ORAL
Qty: 90 TABLET | Refills: 3 | OUTPATIENT
Start: 2024-10-04

## 2024-10-04 NOTE — TELEPHONE ENCOUNTER
Endocrinology consulted for BG management.   BG goal 140-180      - IIP  - Requires intensive BG monitoring.   - BG checks q1hr  - Hypoglycemia protocol in place  - DKA patient work-up completed (Negative for DKA BHB 1.7)   - BMP Q4hr  - If worsening acidosis patient to be referred to fellow for DKA management.    - Notify endocrine if patient becomes hypokalemic (K < 3.3) and/or is not responding to replacement.   - Notify endocrine if patient requiring > 20 units/hr.      ** Please notify Endocrine for any change and/or advance in diet**  ** Please call Endocrine for any BG related issues **    Discharge Planning:  TBD. Please notify endocrinology prior to discharge.       New Medication Request    Contacts         Type Contact Phone/Fax    01/22/2024 09:57 AM CST Phone (Incoming) Cathy Beckwith (Self) [phone] (M)            What medication are you requesting?: Cipro    Reason for medication request: Going to [place] on 01/31/24 and is requesting 2 weeks worth for food poisoning.     Have you taken this medication before?: No    Controlled Substance Agreement on file:   CSA -- Patient Level:    CSA: None found at the patient level.         Patient offered an appointment? No    Preferred Pharmacy:   [facility]/pharmacy #[id number] - [place], [place] - [address] [place] [address]  Phone: [phone] Fax: [phone]        Could we send this information to you in [place] or would you prefer to receive a phone call?:   Patient would prefer a phone call   Okay to leave a detailed message?: Yes at Cell number on file:    Telephone Information:   Mobile [phone]

## 2024-10-07 ENCOUNTER — TRANSFERRED RECORDS (OUTPATIENT)
Dept: HEALTH INFORMATION MANAGEMENT | Facility: CLINIC | Age: 79
End: 2024-10-07
Payer: COMMERCIAL

## 2024-10-20 DIAGNOSIS — E78.00 PURE HYPERCHOLESTEROLEMIA: ICD-10-CM

## 2024-10-21 RX ORDER — ATORVASTATIN CALCIUM 80 MG/1
TABLET, FILM COATED ORAL
Qty: 90 TABLET | Refills: 3 | OUTPATIENT
Start: 2024-10-21

## 2024-11-08 DIAGNOSIS — E78.00 PURE HYPERCHOLESTEROLEMIA: ICD-10-CM

## 2024-11-08 RX ORDER — ATORVASTATIN CALCIUM 80 MG/1
TABLET, FILM COATED ORAL
Qty: 90 TABLET | Refills: 3 | OUTPATIENT
Start: 2024-11-08

## 2024-11-20 ENCOUNTER — TRANSFERRED RECORDS (OUTPATIENT)
Dept: HEALTH INFORMATION MANAGEMENT | Facility: CLINIC | Age: 79
End: 2024-11-20
Payer: COMMERCIAL

## 2024-11-24 DIAGNOSIS — E78.00 PURE HYPERCHOLESTEROLEMIA: ICD-10-CM

## 2024-11-25 RX ORDER — ATORVASTATIN CALCIUM 80 MG/1
TABLET, FILM COATED ORAL
Qty: 90 TABLET | Refills: 3 | Status: SHIPPED | OUTPATIENT
Start: 2024-11-25

## 2024-12-01 SDOH — HEALTH STABILITY: PHYSICAL HEALTH: ON AVERAGE, HOW MANY MINUTES DO YOU ENGAGE IN EXERCISE AT THIS LEVEL?: 30 MIN

## 2024-12-01 SDOH — HEALTH STABILITY: PHYSICAL HEALTH: ON AVERAGE, HOW MANY DAYS PER WEEK DO YOU ENGAGE IN MODERATE TO STRENUOUS EXERCISE (LIKE A BRISK WALK)?: 5 DAYS

## 2024-12-01 ASSESSMENT — SOCIAL DETERMINANTS OF HEALTH (SDOH): HOW OFTEN DO YOU GET TOGETHER WITH FRIENDS OR RELATIVES?: MORE THAN THREE TIMES A WEEK

## 2024-12-02 ENCOUNTER — OFFICE VISIT (OUTPATIENT)
Dept: INTERNAL MEDICINE | Facility: CLINIC | Age: 79
End: 2024-12-02
Payer: COMMERCIAL

## 2024-12-02 VITALS
TEMPERATURE: 97.2 F | BODY MASS INDEX: 22.42 KG/M2 | HEART RATE: 87 BPM | HEIGHT: 64 IN | WEIGHT: 131.3 LBS | SYSTOLIC BLOOD PRESSURE: 120 MMHG | DIASTOLIC BLOOD PRESSURE: 72 MMHG | OXYGEN SATURATION: 98 % | RESPIRATION RATE: 15 BRPM

## 2024-12-02 DIAGNOSIS — J30.1 NON-SEASONAL ALLERGIC RHINITIS DUE TO POLLEN: ICD-10-CM

## 2024-12-02 DIAGNOSIS — E78.00 HYPERCHOLESTEROLEMIA: ICD-10-CM

## 2024-12-02 DIAGNOSIS — E87.1 HYPONATREMIA: ICD-10-CM

## 2024-12-02 DIAGNOSIS — M54.42 CHRONIC BILATERAL LOW BACK PAIN WITH BILATERAL SCIATICA: ICD-10-CM

## 2024-12-02 DIAGNOSIS — E53.8 VITAMIN B12 DEFICIENCY: ICD-10-CM

## 2024-12-02 DIAGNOSIS — E87.5 HYPERKALEMIA: ICD-10-CM

## 2024-12-02 DIAGNOSIS — M54.41 CHRONIC BILATERAL LOW BACK PAIN WITH BILATERAL SCIATICA: ICD-10-CM

## 2024-12-02 DIAGNOSIS — I65.23 BILATERAL CAROTID ARTERY STENOSIS: ICD-10-CM

## 2024-12-02 DIAGNOSIS — I10 ESSENTIAL HYPERTENSION: ICD-10-CM

## 2024-12-02 DIAGNOSIS — G89.29 CHRONIC BILATERAL LOW BACK PAIN WITH BILATERAL SCIATICA: ICD-10-CM

## 2024-12-02 DIAGNOSIS — Z00.00 ANNUAL PHYSICAL EXAM: Primary | ICD-10-CM

## 2024-12-02 LAB
ALBUMIN SERPL BCG-MCNC: 4.3 G/DL (ref 3.5–5.2)
ALBUMIN UR-MCNC: NEGATIVE MG/DL
ALP SERPL-CCNC: 77 U/L (ref 40–150)
ALT SERPL W P-5'-P-CCNC: 19 U/L (ref 0–50)
ANION GAP SERPL CALCULATED.3IONS-SCNC: 12 MMOL/L (ref 7–15)
APPEARANCE UR: CLEAR
AST SERPL W P-5'-P-CCNC: 28 U/L (ref 0–45)
BILIRUB SERPL-MCNC: 0.3 MG/DL
BILIRUB UR QL STRIP: NEGATIVE
BUN SERPL-MCNC: 19.1 MG/DL (ref 8–23)
CALCIUM SERPL-MCNC: 10.2 MG/DL (ref 8.8–10.4)
CHLORIDE SERPL-SCNC: 96 MMOL/L (ref 98–107)
CHOLEST SERPL-MCNC: 177 MG/DL
COLOR UR AUTO: YELLOW
CREAT SERPL-MCNC: 0.88 MG/DL (ref 0.51–0.95)
EGFRCR SERPLBLD CKD-EPI 2021: 67 ML/MIN/1.73M2
ERYTHROCYTE [DISTWIDTH] IN BLOOD BY AUTOMATED COUNT: 13.4 % (ref 10–15)
FASTING STATUS PATIENT QL REPORTED: ABNORMAL
FASTING STATUS PATIENT QL REPORTED: NORMAL
GLUCOSE SERPL-MCNC: 88 MG/DL (ref 70–99)
GLUCOSE UR STRIP-MCNC: NEGATIVE MG/DL
HCO3 SERPL-SCNC: 24 MMOL/L (ref 22–29)
HCT VFR BLD AUTO: 35.7 % (ref 35–47)
HDLC SERPL-MCNC: 82 MG/DL
HGB BLD-MCNC: 11.6 G/DL (ref 11.7–15.7)
HGB UR QL STRIP: NEGATIVE
KETONES UR STRIP-MCNC: NEGATIVE MG/DL
LDLC SERPL CALC-MCNC: 79 MG/DL
LEUKOCYTE ESTERASE UR QL STRIP: ABNORMAL
MCH RBC QN AUTO: 33 PG (ref 26.5–33)
MCHC RBC AUTO-ENTMCNC: 32.5 G/DL (ref 31.5–36.5)
MCV RBC AUTO: 102 FL (ref 78–100)
NITRATE UR QL: NEGATIVE
NONHDLC SERPL-MCNC: 95 MG/DL
PH UR STRIP: 7 [PH] (ref 5–8)
PLATELET # BLD AUTO: 272 10E3/UL (ref 150–450)
POTASSIUM SERPL-SCNC: 5.6 MMOL/L (ref 3.4–5.3)
PROT SERPL-MCNC: 6.8 G/DL (ref 6.4–8.3)
RBC # BLD AUTO: 3.51 10E6/UL (ref 3.8–5.2)
RBC #/AREA URNS AUTO: NORMAL /HPF
SODIUM SERPL-SCNC: 132 MMOL/L (ref 135–145)
SP GR UR STRIP: 1.01 (ref 1–1.03)
TRIGL SERPL-MCNC: 78 MG/DL
TSH SERPL DL<=0.005 MIU/L-ACNC: 1.15 UIU/ML (ref 0.3–4.2)
UROBILINOGEN UR STRIP-ACNC: 0.2 E.U./DL
VIT B12 SERPL-MCNC: 1167 PG/ML (ref 232–1245)
WBC # BLD AUTO: 4.9 10E3/UL (ref 4–11)
WBC #/AREA URNS AUTO: NORMAL /HPF

## 2024-12-02 PROCEDURE — 82607 VITAMIN B-12: CPT | Performed by: INTERNAL MEDICINE

## 2024-12-02 PROCEDURE — 81001 URINALYSIS AUTO W/SCOPE: CPT | Performed by: INTERNAL MEDICINE

## 2024-12-02 PROCEDURE — 84443 ASSAY THYROID STIM HORMONE: CPT | Performed by: INTERNAL MEDICINE

## 2024-12-02 PROCEDURE — 85027 COMPLETE CBC AUTOMATED: CPT | Performed by: INTERNAL MEDICINE

## 2024-12-02 PROCEDURE — 80053 COMPREHEN METABOLIC PANEL: CPT | Performed by: INTERNAL MEDICINE

## 2024-12-02 PROCEDURE — 36415 COLL VENOUS BLD VENIPUNCTURE: CPT | Performed by: INTERNAL MEDICINE

## 2024-12-02 PROCEDURE — G0439 PPPS, SUBSEQ VISIT: HCPCS | Performed by: INTERNAL MEDICINE

## 2024-12-02 PROCEDURE — 80061 LIPID PANEL: CPT | Performed by: INTERNAL MEDICINE

## 2024-12-02 PROCEDURE — 99214 OFFICE O/P EST MOD 30 MIN: CPT | Mod: 25 | Performed by: INTERNAL MEDICINE

## 2024-12-02 RX ORDER — NYSTATIN 100000 U/G
OINTMENT TOPICAL
COMMUNITY
Start: 2024-11-01

## 2024-12-02 RX ORDER — TIZANIDINE 2 MG/1
2-4 TABLET ORAL
COMMUNITY
Start: 2024-09-21

## 2024-12-02 NOTE — PATIENT INSTRUCTIONS
Patient Education   Preventive Care Advice   This is general advice given by our system to help you stay healthy. However, your care team may have specific advice just for you. Please talk to your care team about your preventive care needs.  Nutrition  Eat 5 or more servings of fruits and vegetables each day.  Try wheat bread, brown rice and whole grain pasta (instead of white bread, rice, and pasta).  Get enough calcium and vitamin D. Check the label on foods and aim for 100% of the RDA (recommended daily allowance).  Lifestyle  Exercise at least 150 minutes each week  (30 minutes a day, 5 days a week).  Do muscle strengthening activities 2 days a week. These help control your weight and prevent disease.  No smoking.  Wear sunscreen to prevent skin cancer.  Have a dental exam and cleaning every 6 months.  Yearly exams  See your health care team every year to talk about:  Any changes in your health.  Any medicines your care team has prescribed.  Preventive care, family planning, and ways to prevent chronic diseases.  Shots (vaccines)   HPV shots (up to age 26), if you've never had them before.  Hepatitis B shots (up to age 59), if you've never had them before.  COVID-19 shot: Get this shot when it's due.  Flu shot: Get a flu shot every year.  Tetanus shot: Get a tetanus shot every 10 years.  Pneumococcal, hepatitis A, and RSV shots: Ask your care team if you need these based on your risk.  Shingles shot (for age 50 and up)  General health tests  Diabetes screening:  Starting at age 35, Get screened for diabetes at least every 3 years.  If you are younger than age 35, ask your care team if you should be screened for diabetes.  Cholesterol test: At age 39, start having a cholesterol test every 5 years, or more often if advised.  Bone density scan (DEXA): At age 50, ask your care team if you should have this scan for osteoporosis (brittle bones).  Hepatitis C: Get tested at least once in your life.  STIs (sexually  transmitted infections)  Before age 24: Ask your care team if you should be screened for STIs.  After age 24: Get screened for STIs if you're at risk. You are at risk for STIs (including HIV) if:  You are sexually active with more than one person.  You don't use condoms every time.  You or a partner was diagnosed with a sexually transmitted infection.  If you are at risk for HIV, ask about PrEP medicine to prevent HIV.  Get tested for HIV at least once in your life, whether you are at risk for HIV or not.  Cancer screening tests  Cervical cancer screening: If you have a cervix, begin getting regular cervical cancer screening tests starting at age 21.  Breast cancer scan (mammogram): If you've ever had breasts, begin having regular mammograms starting at age 40. This is a scan to check for breast cancer.  Colon cancer screening: It is important to start screening for colon cancer at age 45.  Have a colonoscopy test every 10 years (or more often if you're at risk) Or, ask your provider about stool tests like a FIT test every year or Cologuard test every 3 years.  To learn more about your testing options, visit:   .  For help making a decision, visit:   https://bit.ly/ll46214.  Prostate cancer screening test: If you have a prostate, ask your care team if a prostate cancer screening test (PSA) at age 55 is right for you.  Lung cancer screening: If you are a current or former smoker ages 50 to 80, ask your care team if ongoing lung cancer screenings are right for you.  For informational purposes only. Not to replace the advice of your health care provider. Copyright   2023 [facility]. All rights reserved. Clinically reviewed by the [facility]. Logentries [id number] - REV 01/24.  Learning About Stress  What is stress?     Stress is your body's response to a hard situation. Your body can have a physical, emotional, or mental response. Stress is a fact of life for most people, and it  affects everyone differently. What causes stress for you may not be stressful for someone else.  A lot of things can cause stress. You may feel stress when you go on a job interview, take a test, or run a race. This kind of short-term stress is normal and even useful. It can help you if you need to work hard or react quickly. For example, stress can help you finish an important job on time.  Long-term stress is caused by ongoing stressful situations or events. Examples of long-term stress include long-term health problems, ongoing problems at work, or conflicts in your family. Long-term stress can harm your health.  How does stress affect your health?  When you are stressed, your body responds as though you are in danger. It makes hormones that speed up your heart, make you breathe faster, and give you a burst of energy. This is called the fight-or-flight stress response. If the stress is over quickly, your body goes back to normal and no harm is done.  But if stress happens too often or lasts too long, it can have bad effects. Long-term stress can make you more likely to get sick, and it can make symptoms of some diseases worse. If you tense up when you are stressed, you may develop neck, shoulder, or low back pain. Stress is linked to high blood pressure and heart disease.  Stress also harms your emotional health. It can make you blakely, tense, or depressed. Your relationships may suffer, and you may not do well at work or school.  What can you do to manage stress?  You can try these things to help manage stress:   Do something active. Exercise or activity can help reduce stress. Walking is a great way to get started. Even everyday activities such as housecleaning or yard work can help.  Try yoga or mandeep chi. These techniques combine exercise and meditation. You may need some training at first to learn them.  Do something you enjoy. For example, listen to music or go to a movie. Practice your hobby or do volunteer  "work.  Meditate. This can help you relax, because you are not worrying about what happened before or what may happen in the future.  Do guided imagery. Imagine yourself in any setting that helps you feel calm. You can use online videos, books, or a teacher to guide you.  Do breathing exercises. For example:  From a standing position, bend forward from the waist with your knees slightly bent. Let your arms dangle close to the floor.  Breathe in slowly and deeply as you return to a standing position. Roll up slowly and lift your head last.  Hold your breath for just a few seconds in the standing position.  Breathe out slowly and bend forward from the waist.  Let your feelings out. Talk, laugh, cry, and express anger when you need to. Talking with supportive friends or family, a counselor, or a nupur leader about your feelings is a healthy way to relieve stress. Avoid discussing your feelings with people who make you feel worse.  Write. It may help to write about things that are bothering you. This helps you find out how much stress you feel and what is causing it. When you know this, you can find better ways to cope.  What can you do to prevent stress?  You might try some of these things to help prevent stress:  Manage your time. This helps you find time to do the things you want and need to do.  Get enough sleep. Your body recovers from the stresses of the day while you are sleeping.  Get support. Your family, friends, and community can make a difference in how you experience stress.  Limit your news feed. Avoid or limit time on social media or news that may make you feel stressed.  Do something active. Exercise or activity can help reduce stress. Walking is a great way to get started.  Where can you learn more?  Go to https://www.[employer].net/patiented  Enter N032 in the search box to learn more about \"Learning About Stress.\"  Current as of: October 24, 2023  Content Version: 14.2 2024 [employer]. " "  Care instructions adapted under license by your healthcare professional. If you have questions about a medical condition or this instruction, always ask your healthcare professional. [employer] Incorporated disclaims any warranty or liability for your use of this information.    9 Ways to Cut Back on Drinking  Maybe you've found yourself drinking more alcohol than you'd prefer. If you want to cut back, here are some ideas to try.    Think before you drink.  Do you really want a drink, or is it just a habit? If you're used to having a drink at a certain time, try doing something else then.     Look for substitutes.  Find some no-alcohol drinks that you enjoy, like flavored seltzer water, tea with honey, or tonic with a slice of lime. Or try alcohol-free beer or \"virgin\" cocktails (without the alcohol).     Drink more water.  Use water to quench your thirst. Drink a glass of water before you have any alcohol. Have another glass along with every drink or between drinks.     Shrink your drink.  For example, have a bottle of beer instead of a pint. Use a smaller glass for wine. Choose drinks with lower alcohol content (ABV%). Or use less liquor and more mixer in cocktails.     Slow down.  It's easy to drink quickly and without thinking about it. Pay attention, and make each drink last longer.     Do the math.  Total up how much you spend on alcohol each month. How much is that a year? If you cut back, what could you do with the money you save?     Take a break.  Choose a day or two each week when you won't drink at all. Notice how you feel on those days, physically and emotionally. How did you sleep? Do you feel better? Over time, add more break days.     Count calories.  Would you like to lose some weight? For some people that's a good motivator for cutting back. Figure out how many calories are in each drink. How many does that add up to in a day? In a week? In a month?     Practice saying no.  Be ready when someone " "offers you a drink. Try: \"Thanks, I've had enough.\" Or \"Thanks, but I'm cutting back.\" Or \"No, thanks. I feel better when I drink less.\"   Current as of: November 15, 2023  Content Version: 14.2 2024 [facility] [employer] [facility].   Care instructions adapted under license by your healthcare professional. If you have questions about a medical condition or this instruction, always ask your healthcare professional. Healthwise, Incorporated disclaims any warranty or liability for your use of this information.     "

## 2024-12-02 NOTE — PROGRESS NOTES
Preventive Care Visit  [facility]  Zeb Damon MD, Internal Medicine  Dec 2, 2024      1. Annual physical exam (Primary)  This is a 71-year-old woman with issues as discussed below    2. Bilateral carotid artery stenosis  Have been treating medically, asymptomatic, repeat ultrasound  - US Carotid Bilateral; Future    3. Essential hypertension  Blood pressure okay continue same    4. Hypercholesterolemia  Continue high-dose statin  - CBC with platelets; Future  - Comprehensive metabolic panel; Future  - Lipid panel reflex to direct LDL Fasting; Future  - TSH with free T4 reflex; Future  - UA Macroscopic with reflex to Microscopic and Culture; Future    5. Chronic bilateral low back pain with bilateral sciatica  Stable ongoing symptoms, gabapentin and meloxicam are helpful    6. Vitamin B12 deficiency  Continue vitamin B12  - Vitamin B12; Future    7. Non-seasonal allergic rhinitis due to pollen  Continue same    Subjective   Cathy is a 79 year old, presenting for the following:  Annual Visit        12/2/2024     9:47 AM   Additional Questions   Roomed by GEORGE Sawyer           HPI  Health Care Directive  Patient does not have a Health Care Directive: Discussed advance care planning with patient; information given to patient to review.      12/1/2024   General Health   How would you rate your overall physical health? Good   Feel stress (tense, anxious, or unable to sleep) To some extent      (!) STRESS CONCERN      12/1/2024   Nutrition   Diet: Regular (no restrictions)            12/1/2024   Exercise   Days per week of moderate/strenous exercise 5 days   Average minutes spent exercising at this level 30 min            12/1/2024   Social Factors   Frequency of gathering with friends or relatives More than three times a week   Worry food won't last until get money to buy more No   Food not last or not have enough money for food? No   Do you have housing? (Housing is defined as stable permanent  housing and does not include staying ouside in a car, in a tent, in an abandoned building, in an overnight shelter, or couch-surfing.) Yes   Are you worried about losing your housing? No   Lack of transportation? No   Unable to get utilities (heat,electricity)? No            12/1/2024   Fall Risk   Fallen 2 or more times in the past year? No     No    Trouble with walking or balance? No     No        Patient-reported    Multiple values from one day are sorted in reverse-chronological order          12/1/2024   Activities of Daily Living- Home Safety   Needs help with the following daily activites None of the above   Safety concerns in the home None of the above            12/1/2024   Dental   Dentist two times every year? Yes            12/1/2024   Hearing Screening   Hearing concerns? None of the above            12/1/2024   Driving Risk Screening   Patient/family members have concerns about driving No            12/1/2024   General Alertness/Fatigue Screening   Have you been more tired than usual lately? No            12/1/2024   Urinary Incontinence Screening   Bothered by leaking urine in past 6 months No            12/1/2024   TB Screening   Were you born outside of the [place]? No            Today's PHQ-2 Score:       12/1/2024     6:52 PM   PHQ-2 ( 1999 [employer])   Q1: Little interest or pleasure in doing things 0    Q2: Feeling down, depressed or hopeless 0    PHQ-2 Score 0    Q1: Little interest or pleasure in doing things Not at all   Q2: Feeling down, depressed or hopeless Not at all   PHQ-2 Score 0       Patient-reported           12/1/2024   Substance Use   Alcohol more than 3/day or more than 7/wk Yes   How often do you have a drink containing alcohol 4 or more times a week   How many alcohol drinks on typical day 1 or 2   How often do you have 5+ drinks at one occasion Never   Audit 2/3 Score 0   How often not able to stop drinking once started Never   How often failed to do what normally expected Never    How often needed first drink in am after a heavy drinking session Never   How often feeling of guilt or remorse after drinking Less than monthly   How often unable to remember what happened the night before Never   Have you or someone else been injured because of your drinking No   Has anyone been concerned or suggested you cut down on drinking No   TOTAL SCORE - AUDIT 5   Do you have a current opioid prescription? No   How severe/bad is pain from 1 to 10? 3/10   Do you use any other substances recreationally? No        Social History     Tobacco Use    Smoking status: Never    Smokeless tobacco: Never   Vaping Use    Vaping status: Never Used   Substance Use Topics    Alcohol use: Yes     Comment: glass of wine daily     Drug use: No           11/10/2021   LAST FHS-7 RESULTS   1st degree relative breast or ovarian cancer No   Any relative bilateral breast cancer No   Any male have breast cancer No   Any ONE woman have BOTH breast AND ovarian cancer No   Any woman with breast cancer before 50yrs No   2 or more relatives with breast AND/OR ovarian cancer No   2 or more relatives with breast AND/OR bowel cancer No           ASCVD Risk   The 10-year ASCVD risk score (Darwin HODGES, et al., 2019) is: 26.9%    Values used to calculate the score:      Age: 79 years      Sex: Female      Is Non- : No      Diabetic: No      Tobacco smoker: No      Systolic Blood Pressure: 120 mmHg      Is BP treated: Yes      HDL Cholesterol: 88 mg/dL      Total Cholesterol: 161 mg/dL          Reviewed and updated as needed this visit by Provider                    Current providers sharing in care for this patient include:  Patient Care Team:  Zeb Damon MD as PCP - General (Internal Medicine)  Zeb Damon MD as Assigned PCP    The following health maintenance items are reviewed in Epic and correct as of today:  Health Maintenance   Topic Date Due    BMP  09/12/2024    LIPID  09/12/2024    ANNUAL  "REVIEW OF HM ORDERS  11/30/2024    MEDICARE ANNUAL WELLNESS VISIT  11/30/2024    COLORECTAL CANCER SCREENING  05/20/2025    FALL RISK ASSESSMENT  12/02/2025    GLUCOSE  09/12/2026    ADVANCE CARE PLANNING  11/30/2028    DEXA  04/17/2032    DTAP/TDAP/TD IMMUNIZATION (4 - Td or Tdap) 08/22/2034    HEPATITIS C SCREENING  Completed    PHQ-2 (once per calendar year)  Completed    INFLUENZA VACCINE  Completed    Pneumococcal Vaccine: 65+ Years  Completed    ZOSTER IMMUNIZATION  Completed    RSV VACCINE  Completed    COVID-19 Vaccine  Completed    HPV IMMUNIZATION  Aged Out    MENINGITIS IMMUNIZATION  Aged Out    RSV MONOCLONAL ANTIBODY  Aged Out    MAMMO SCREENING  Discontinued          Objective    Exam  /72 (BP Location: Left arm, Patient Position: Sitting, Cuff Size: Adult Regular)   Pulse 87   Temp 97.2  F (36.2  C) (Tympanic)   Resp 15   Ht 1.613 m (5' 3.5\")   Wt 59.6 kg (131 lb 4.8 oz)   LMP  (LMP Unknown)   SpO2 98%   BMI 22.89 kg/m     Estimated body mass index is 22.89 kg/m  as calculated from the following:    Height as of this encounter: 1.613 m (5' 3.5\").    Weight as of this encounter: 59.6 kg (131 lb 4.8 oz).    Physical Exam  EYES: Eyelids, conjunctiva, and sclera were normal.   HEAD, EARS, NOSE, MOUTH, AND THROAT: Head and face were normal. Hearing was normal to voice and the ears were normal to external exam. Nose appearance was normal and there was no discharge.   NECK: Neck appearance was normal. There were no neck masses and the thyroid was not enlarged.  RESPIRATORY: Breathing pattern was normal and the chest moved symmetrically.  mathieu sounds were normal and there were no abnormal sounds.  CARDIOVASCULAR: Heart rate and rhythm were normal.  S1 and S2 were normal and there were no extra sounds or murmurs. There was no peripheral edema.  GASTROINTESTINAL: The abdomen was normal in contour.   NEUROLOGIC: The patient was alert and oriented to person, place, time, and circumstance. Speech was " normal. Cranial nerves were normal. Motor strength was normal for age. The patient was normally coordinated.  PSYCHIATRIC:  Mood and affect were normal and the patient had normal recent and remote memory. The patient's judgment and insight were normal.        12/2/2024   Mini Cog   Clock Draw Score 2 Normal   3 Item Recall 3 objects recalled   Mini Cog Total Score 5                 Signed Electronically by: Zeb Damon MD

## 2025-01-02 ENCOUNTER — LAB (OUTPATIENT)
Dept: LAB | Facility: CLINIC | Age: 80
End: 2025-01-02
Payer: COMMERCIAL

## 2025-01-02 DIAGNOSIS — E87.5 HYPERKALEMIA: ICD-10-CM

## 2025-01-02 DIAGNOSIS — E87.1 HYPONATREMIA: ICD-10-CM

## 2025-01-02 LAB
ANION GAP SERPL CALCULATED.3IONS-SCNC: 11 MMOL/L (ref 7–15)
BUN SERPL-MCNC: 13.2 MG/DL (ref 8–23)
CALCIUM SERPL-MCNC: 9.7 MG/DL (ref 8.8–10.4)
CHLORIDE SERPL-SCNC: 95 MMOL/L (ref 98–107)
CORTIS SERPL-MCNC: 10.8 UG/DL
CREAT SERPL-MCNC: 0.78 MG/DL (ref 0.51–0.95)
EGFRCR SERPLBLD CKD-EPI 2021: 77 ML/MIN/1.73M2
GLUCOSE SERPL-MCNC: 91 MG/DL (ref 70–99)
HCO3 SERPL-SCNC: 24 MMOL/L (ref 22–29)
POTASSIUM SERPL-SCNC: 4.5 MMOL/L (ref 3.4–5.3)
SODIUM SERPL-SCNC: 130 MMOL/L (ref 135–145)

## 2025-01-08 LAB — ACTH PLAS-MCNC: <10 PG/ML

## 2025-02-15 DIAGNOSIS — I10 ESSENTIAL HYPERTENSION: ICD-10-CM

## 2025-02-17 RX ORDER — AMLODIPINE BESYLATE 10 MG/1
10 TABLET ORAL DAILY
Qty: 90 TABLET | Refills: 2 | Status: SHIPPED | OUTPATIENT
Start: 2025-02-17

## 2025-02-18 DIAGNOSIS — E53.8 VITAMIN B12 DEFICIENCY: ICD-10-CM

## 2025-02-18 RX ORDER — OMEGA-3/DHA/EPA/FISH OIL 35-113.5MG
1000 TABLET,CHEWABLE ORAL DAILY
Qty: 90 TABLET | Refills: 2 | Status: SHIPPED | OUTPATIENT
Start: 2025-02-18

## 2025-02-21 ENCOUNTER — HOSPITAL ENCOUNTER (OUTPATIENT)
Dept: ULTRASOUND IMAGING | Facility: CLINIC | Age: 80
Discharge: HOME | End: 2025-02-21
Attending: INTERNAL MEDICINE | Admitting: INTERNAL MEDICINE
Payer: COMMERCIAL

## 2025-02-21 DIAGNOSIS — I65.23 BILATERAL CAROTID ARTERY STENOSIS: ICD-10-CM

## 2025-02-21 PROCEDURE — 93880 EXTRACRANIAL BILAT STUDY: CPT

## 2025-03-13 ENCOUNTER — TRANSFERRED RECORDS (OUTPATIENT)
Dept: HEALTH INFORMATION MANAGEMENT | Facility: CLINIC | Age: 80
End: 2025-03-13
Payer: COMMERCIAL

## 2025-03-13 DIAGNOSIS — I10 ESSENTIAL HYPERTENSION: ICD-10-CM

## 2025-03-13 RX ORDER — LOSARTAN POTASSIUM 100 MG/1
100 TABLET ORAL DAILY
Qty: 90 TABLET | Refills: 1 | Status: SHIPPED | OUTPATIENT
Start: 2025-03-13

## 2025-05-28 DIAGNOSIS — B00.9 HERPES SIMPLEX: ICD-10-CM

## 2025-05-28 RX ORDER — VALACYCLOVIR HYDROCHLORIDE 1 G/1
500 TABLET, FILM COATED ORAL DAILY
Qty: 45 TABLET | Refills: 1 | Status: SHIPPED | OUTPATIENT
Start: 2025-05-28

## 2025-06-03 ENCOUNTER — TRANSFERRED RECORDS (OUTPATIENT)
Dept: ADMINISTRATIVE | Facility: CLINIC | Age: 80
End: 2025-06-03
Payer: COMMERCIAL

## 2025-06-03 NOTE — PROCEDURES
[facility]   [address] [place] [address], [address], [place] [address]     Patient Name: Cathy Beckwith  Gender:   Female  Exam Date: 06/03/2025 Visit Number:   [id number]  Age: 79 Years 11 Months YOB: 1945  Attending MD: Lee Chavez MD Medical Record#:   [id number]  -----------------------------------------------------------------------------------------------------------------------------   Procedure: Colonoscopy   Indications: Follow up previous lesion (large SSA removed piecemeal from ascending colon in 11/2024), high risk colorectal cancer screening   Colonoscopy in 2019 with 1 TA and 2 SSAs   Referring MD: Referral Self  Primary MD:      Zeb Damon MD   Medications: Admitting Medications:   0.9% Normal Saline at [facility]   Intra Procedure Medications:   Patient received monitored anesthesia care.     Complications: No immediate complications  ______________________________________________________________________________  Procedure:   An examination of the heart and lungs was performed and found to be within acceptable limits.  The patient was therefore deemed a reasonable candidate for endoscopy and monitored anesthesia care.   The risks, benefits and plan of the procedure were discussed with the patient and/or patient representative and all questions were answered.  After obtaining informed consent, the patient received monitored anesthesia care and I passed the scope   without difficulty via the rectum to the cecum.  The appendiceal orifice and ic valve were identified.  The scope was retroflexed during the examination  The quality of the prep was excellent  (Miralax/Gatorade/2 tablets Bisacodyl/Magnesium Citrate).    This was a complete examination throughout the entire colon.    Findings:    Diverticulosis.  Location: - sigmoid.      Size:  medium.    Quantity:  several.    Anal canal:  internal hemorrhoid(s)    Endoscopic tattoo site noted in the ascending colon with normal  adjacent mucosa.  Location.    Remainder of the exam is normal.    Impression:  Personal history of colon polyps, unspecified   Diverticulosis of large intestine without perforation or abscess without bleeding   Other hemorrhoids     Plan  Repeat colonoscopy not indicated at this time.  Recommendation Comments:    -No further colon cancer screening recommended.    Orders    Instruction(s)/Education:  Instruction/Education Timeframe Assessment   Colon Cancer Prevention  K57.30           Electronically signed by:___________________  Lee Chavez MD                 06/03/2025    Medications:  Medication Dose Sig Description PRN Status PRN Reason Comments   Advil unknown unknown Take one tablet by mouth daily N  taking as directed   amlodipine 10 mg tablet 10 mg take 1 tablet by oral route  every day N  taking as directed   Aspir-81 81 mg tablet,delayed release 81 mg take 1 tablet by oral route  every day N  taking as directed   atorvastatin 80 mg tablet 80 mg take 1 tablet by oral route  every day N  taking as directed   cetirizine 10 mg tablet 10 mg take 1 tablet by oral route  every day N  taking as directed   gabapentin 100 mg capsule 100 mg take 1 capsule by oral route 3 times every day N  taking as directed   losartan 100 mg tablet 100 mg take 1 tablet by oral route  every day N  taking as directed   meloxicam 15 mg tablet 15 mg take 1 tablet by oral route  every day N  taking as directed   Nasonex 50 mcg/actuation Spray 50 mcg/actuation spray 2 spray by Intranasal route 2 times every day in each nostril N  taking as directed   Valtrex 1 g Tab 1,000 mg every day N  taking as directed   Vitamin D3 2,000 unit capsule 50 mcg (2,000 unit) take 1 by Oral route  every day N  taking as directed     Allergies:  Medication Name Ingredient Reaction Comment    CODEINE Nausea      Vital Signs:  Date Time Systolic Diastolic Height Weight BMI   06/03/2025 10:40  64 65 in 131.00 21.80     Race:  White  Ethnicity:   Declined to specify  Preferred Language:  English      cc: Zeb Damon MD        [facility] [phone]

## 2025-06-24 ENCOUNTER — TRANSFERRED RECORDS (OUTPATIENT)
Dept: HEALTH INFORMATION MANAGEMENT | Facility: CLINIC | Age: 80
End: 2025-06-24
Payer: COMMERCIAL

## 2025-07-30 DIAGNOSIS — M54.16 RADICULOPATHY, LUMBAR REGION: ICD-10-CM

## 2025-07-30 RX ORDER — MELOXICAM 15 MG/1
15 TABLET ORAL
Qty: 90 TABLET | Refills: 4 | Status: SHIPPED | OUTPATIENT
Start: 2025-07-30

## 2025-08-04 ENCOUNTER — TRANSFERRED RECORDS (OUTPATIENT)
Dept: HEALTH INFORMATION MANAGEMENT | Facility: CLINIC | Age: 80
End: 2025-08-04
Payer: COMMERCIAL

## 2025-08-27 ENCOUNTER — TRANSFERRED RECORDS (OUTPATIENT)
Dept: HEALTH INFORMATION MANAGEMENT | Facility: CLINIC | Age: 80
End: 2025-08-27
Payer: COMMERCIAL

## 2025-09-01 DIAGNOSIS — I10 ESSENTIAL HYPERTENSION: ICD-10-CM

## 2025-09-02 RX ORDER — LOSARTAN POTASSIUM 100 MG/1
100 TABLET ORAL DAILY
Qty: 90 TABLET | Refills: 0 | Status: SHIPPED | OUTPATIENT
Start: 2025-09-02